# Patient Record
Sex: MALE
[De-identification: names, ages, dates, MRNs, and addresses within clinical notes are randomized per-mention and may not be internally consistent; named-entity substitution may affect disease eponyms.]

---

## 2019-06-19 ENCOUNTER — HOSPITAL ENCOUNTER (INPATIENT)
Dept: HOSPITAL 5 - ED | Age: 69
LOS: 8 days | Discharge: NURSING, REHAB OR LONG-TERM CARE | DRG: 871 | End: 2019-06-27
Attending: INTERNAL MEDICINE | Admitting: INTERNAL MEDICINE
Payer: MEDICARE

## 2019-06-19 DIAGNOSIS — E78.5: ICD-10-CM

## 2019-06-19 DIAGNOSIS — A41.9: Primary | ICD-10-CM

## 2019-06-19 DIAGNOSIS — G45.9: ICD-10-CM

## 2019-06-19 DIAGNOSIS — E87.0: ICD-10-CM

## 2019-06-19 DIAGNOSIS — J18.9: ICD-10-CM

## 2019-06-19 DIAGNOSIS — J01.90: ICD-10-CM

## 2019-06-19 DIAGNOSIS — I16.0: ICD-10-CM

## 2019-06-19 DIAGNOSIS — F03.90: ICD-10-CM

## 2019-06-19 DIAGNOSIS — G93.41: ICD-10-CM

## 2019-06-19 DIAGNOSIS — E87.6: ICD-10-CM

## 2019-06-19 LAB
ALBUMIN SERPL-MCNC: 4.4 G/DL (ref 3.9–5)
ALT SERPL-CCNC: 26 UNITS/L (ref 7–56)
APTT BLD: 29.6 SEC. (ref 24.2–36.6)
BASOPHILS # (AUTO): 0.1 K/MM3 (ref 0–0.1)
BASOPHILS NFR BLD AUTO: 0.9 % (ref 0–1.8)
BENZODIAZEPINES SCREEN,URINE: (no result)
BILIRUB UR QL STRIP: (no result)
BLOOD UR QL VISUAL: (no result)
BUN SERPL-MCNC: 19 MG/DL (ref 9–20)
BUN/CREAT SERPL: 16 %
CALCIUM SERPL-MCNC: 9.1 MG/DL (ref 8.4–10.2)
EOSINOPHIL # BLD AUTO: 0.1 K/MM3 (ref 0–0.4)
EOSINOPHIL NFR BLD AUTO: 1.2 % (ref 0–4.3)
HCT VFR BLD CALC: 38.9 % (ref 35.5–45.6)
HEMOLYSIS INDEX: 9
HGB BLD-MCNC: 13.8 GM/DL (ref 11.8–15.2)
INR PPP: 1.02 (ref 0.87–1.13)
LYMPHOCYTES # BLD AUTO: 1.9 K/MM3 (ref 1.2–5.4)
LYMPHOCYTES NFR BLD AUTO: 15.4 % (ref 13.4–35)
MCHC RBC AUTO-ENTMCNC: 36 % (ref 32–34)
MCV RBC AUTO: 90 FL (ref 84–94)
METHADONE SCREEN,URINE: (no result)
MONOCYTES # (AUTO): 0.9 K/MM3 (ref 0–0.8)
MONOCYTES % (AUTO): 7.7 % (ref 0–7.3)
OPIATE SCREEN,URINE: (no result)
PH UR STRIP: 7 [PH] (ref 5–7)
PLATELET # BLD: 195 K/MM3 (ref 140–440)
RBC # BLD AUTO: 4.33 M/MM3 (ref 3.65–5.03)
RBC #/AREA URNS HPF: 2 /HPF (ref 0–6)
UROBILINOGEN UR-MCNC: 2 MG/DL (ref ?–2)
WBC #/AREA URNS HPF: < 1 /HPF (ref 0–6)

## 2019-06-19 PROCEDURE — 81001 URINALYSIS AUTO W/SCOPE: CPT

## 2019-06-19 PROCEDURE — 71045 X-RAY EXAM CHEST 1 VIEW: CPT

## 2019-06-19 PROCEDURE — G0480 DRUG TEST DEF 1-7 CLASSES: HCPCS

## 2019-06-19 PROCEDURE — 93010 ELECTROCARDIOGRAM REPORT: CPT

## 2019-06-19 PROCEDURE — 80307 DRUG TEST PRSMV CHEM ANLYZR: CPT

## 2019-06-19 PROCEDURE — 82140 ASSAY OF AMMONIA: CPT

## 2019-06-19 PROCEDURE — 80061 LIPID PANEL: CPT

## 2019-06-19 PROCEDURE — 96361 HYDRATE IV INFUSION ADD-ON: CPT

## 2019-06-19 PROCEDURE — 93880 EXTRACRANIAL BILAT STUDY: CPT

## 2019-06-19 PROCEDURE — 96365 THER/PROPH/DIAG IV INF INIT: CPT

## 2019-06-19 PROCEDURE — 80048 BASIC METABOLIC PNL TOTAL CA: CPT

## 2019-06-19 PROCEDURE — 80053 COMPREHEN METABOLIC PANEL: CPT

## 2019-06-19 PROCEDURE — 84100 ASSAY OF PHOSPHORUS: CPT

## 2019-06-19 PROCEDURE — 99285 EMERGENCY DEPT VISIT HI MDM: CPT

## 2019-06-19 PROCEDURE — 87040 BLOOD CULTURE FOR BACTERIA: CPT

## 2019-06-19 PROCEDURE — 85025 COMPLETE CBC W/AUTO DIFF WBC: CPT

## 2019-06-19 PROCEDURE — 85610 PROTHROMBIN TIME: CPT

## 2019-06-19 PROCEDURE — 80320 DRUG SCREEN QUANTALCOHOLS: CPT

## 2019-06-19 PROCEDURE — 36415 COLL VENOUS BLD VENIPUNCTURE: CPT

## 2019-06-19 PROCEDURE — 83735 ASSAY OF MAGNESIUM: CPT

## 2019-06-19 PROCEDURE — 85730 THROMBOPLASTIN TIME PARTIAL: CPT

## 2019-06-19 PROCEDURE — 70450 CT HEAD/BRAIN W/O DYE: CPT

## 2019-06-19 PROCEDURE — 93005 ELECTROCARDIOGRAM TRACING: CPT

## 2019-06-19 RX ADMIN — POTASSIUM CHLORIDE ONE MEQ: 1500 TABLET, EXTENDED RELEASE ORAL at 22:29

## 2019-06-19 RX ADMIN — Medication SCH ML: at 22:29

## 2019-06-19 RX ADMIN — POTASSIUM CHLORIDE ONE: 1500 TABLET, EXTENDED RELEASE ORAL at 23:28

## 2019-06-19 RX ADMIN — DOCUSATE SODIUM SCH MG: 100 CAPSULE, LIQUID FILLED ORAL at 22:28

## 2019-06-19 RX ADMIN — LORAZEPAM PRN MG: 2 INJECTION INTRAMUSCULAR; INTRAVENOUS at 22:22

## 2019-06-19 NOTE — EMERGENCY DEPARTMENT REPORT
ED Altered Mental Status HPI





- General


Chief Complaint: Altered Mental Status


Stated Complaint: TATUM


Time Seen by Provider: 06/19/19 15:56


Source: patient, EMS


Mode of arrival: Stretcher


Limitations: Altered Mental Status





- History of Present Illness


Initial Comments: 





68-year-old  male found [employer], very disoriented, does not know the 

date, time, and not oriented to person.  He was then transferred to ED via EMS. 

Finger stick glucose were within normal limits, upon arrival, patient denies any

focal weakness, was oriented to time but not to person and place.  [facility] ED 

patient became more oriented, denies any focal weakness, complain of a mild 

generalized weakness.  Unable to locate patient's family although several 

attempts were made, by calling the phone numbers on his phone that had similar 

last name to his.  After a few hours in ED patient's neighbor was able to be 

located, he states that the patient's mental status is totally different from 

what he used to. 





- Related Data


                                Home Medications











 Medication  Instructions  Recorded  Confirmed  Last Taken


 


Unobtainable  06/19/19 06/19/19 Unknown











                                    Allergies











Allergy/AdvReac Type Severity Reaction Status Date / Time


 


No Known Allergies Allergy   Unverified 06/19/19 16:03














ED Review of Systems


ROS: 


Stated complaint: TATUM


Other details as noted in HPI





Comment: Unobtainable due to pts medical conditions





ED Past Medical Hx





- Social History


Smoking Status: Current Every Day Smoker


Substance Use Type: None





- Medications


Home Medications: 


                                Home Medications











 Medication  Instructions  Recorded  Confirmed  Last Taken  Type


 


Unobtainable  06/19/19 06/19/19 Unknown History














ED Physical Exam





- General


Limitations: Altered Mental Status


General appearance: alert, in no apparent distress





- Head


Head exam: Present: atraumatic, normocephalic





- Eye


Eye exam: Present: normal appearance





- ENT


ENT exam: Present: normal exam





- Neck


Neck exam: Present: normal inspection





- Respiratory


Respiratory exam: Present: other (decreased breath sound bilaterally)





- Cardiovascular


Cardiovascular Exam: Present: regular rate





- GI/Abdominal


GI/Abdominal exam: Present: soft, normal bowel sounds





- Extremities Exam


Extremities exam: Present: normal inspection





- Back Exam


Back exam: Present: normal inspection





- Neurological Exam


Neurological exam: Present: alert, oriented X3





- Assessment


Assessment Interval: Baseline





- Level of Consciousness


1a. Level of Consciousness: alert/keenly responsive





- LOC Questions


1b. LOC Questions: answers both correctly





- LOC Command


1c. LOC Commands: performs tasks correctly





- Best Gaze


2. Best Gaze: normal





- Visual


3. Visual: no visual loss





- Facial Palsy


4. Facial Palsy: normal symmetrical movement





- Motor Arm


5a. Motor Arm Left: no drift


5b. Motor Arm Right: no drift





- Motor Leg


6a. Motor Leg Left: no drift


6b. Motor Leg Right: no drift





- Limb Ataxia


7. Limb Ataxia: absent





- Sensory


8. Sensory: normal





- Best Language


9. Best Language: no aphasia





- Dysarthria


10. Dysarthria: normal





- Extinction and Inattention


11. Extinction/Inattention: no abnormality





- Scoring


Total Score: 0


Stroke Severity: No Stroke Symptoms





ED Course


                                   Vital Signs











  06/19/19 06/19/19 06/19/19





  15:53 15:59 17:22


 


Temperature 98.2 F  


 


Pulse Rate 90  70


 


Respiratory 19 19 18





Rate   


 


Blood Pressure 160/97  


 


Blood Pressure   163/89





[Left]   


 


O2 Sat by Pulse 96 96 96





Oximetry   














  06/19/19





  18:54


 


Temperature 


 


Pulse Rate 78


 


Respiratory 14





Rate 


 


Blood Pressure 


 


Blood Pressure 186/97





[Left] 


 


O2 Sat by Pulse 96





Oximetry 














- Lab Data


Result diagrams: 


                                06/19/19 Unknown





                                06/19/19 Unknown


                                   Lab Results











  06/19/19 06/19/19 06/19/19 Range/Units





  16:08 17:50 17:50 


 


WBC     (4.5-11.0)  K/mm3


 


RBC     (3.65-5.03)  M/mm3


 


Hgb     (11.8-15.2)  gm/dl


 


Hct     (35.5-45.6)  %


 


MCV     (84-94)  fl


 


MCH     (28-32)  pg


 


MCHC     (32-34)  %


 


RDW     (13.2-15.2)  %


 


Plt Count     (140-440)  K/mm3


 


Lymph % (Auto)     (13.4-35.0)  %


 


Mono % (Auto)     (0.0-7.3)  %


 


Eos % (Auto)     (0.0-4.3)  %


 


Baso % (Auto)     (0.0-1.8)  %


 


Lymph #     (1.2-5.4)  K/mm3


 


Mono #     (0.0-0.8)  K/mm3


 


Eos #     (0.0-0.4)  K/mm3


 


Baso #     (0.0-0.1)  K/mm3


 


Seg Neutrophils %     (40.0-70.0)  %


 


Seg Neutrophils #     (1.8-7.7)  K/mm3


 


PT     (12.2-14.9)  Sec.


 


INR     (0.87-1.13)  


 


APTT     (24.2-36.6)  Sec.


 


Sodium     (137-145)  mmol/L


 


Potassium     (3.6-5.0)  mmol/L


 


Chloride     ()  mmol/L


 


Carbon Dioxide     (22-30)  mmol/L


 


Anion Gap     mmol/L


 


BUN     (9-20)  mg/dL


 


Creatinine     (0.8-1.5)  mg/dL


 


Estimated GFR     ml/min


 


BUN/Creatinine Ratio     %


 


Glucose     ()  mg/dL


 


Lactic Acid  1.40    (0.7-2.0)  mmol/L


 


Calcium     (8.4-10.2)  mg/dL


 


Total Bilirubin     (0.1-1.2)  mg/dL


 


AST     (5-40)  units/L


 


ALT     (7-56)  units/L


 


Alkaline Phosphatase     ()  units/L


 


Total Protein     (6.3-8.2)  g/dL


 


Albumin     (3.9-5)  g/dL


 


Albumin/Globulin Ratio     %


 


Urine Color   Yellow   (Yellow)  


 


Urine Turbidity   Clear   (Clear)  


 


Urine pH   7.0   (5.0-7.0)  


 


Ur Specific Gravity   1.012   (1.003-1.030)  


 


Urine Protein   100 mg/dl   (Negative)  mg/dL


 


Urine Glucose (UA)   Neg   (Negative)  mg/dL


 


Urine Ketones   Tr   (Negative)  mg/dL


 


Urine Blood   Neg   (Negative)  


 


Urine Nitrite   Neg   (Negative)  


 


Urine Bilirubin   Neg   (Negative)  


 


Urine Urobilinogen   2.0   (<2.0)  mg/dL


 


Ur Leukocyte Esterase   Neg   (Negative)  


 


Urine WBC (Auto)   < 1.0   (0.0-6.0)  /HPF


 


Urine RBC (Auto)   2.0   (0.0-6.0)  /HPF


 


Urine Opiates Screen    Presumptive negative  


 


Urine Methadone Screen    Presumptive negative  


 


Ur Barbiturates Screen    Presumptive negative  


 


Ur Phencyclidine Scrn    Presumptive negative  


 


Ur Amphetamines Screen    Presumptive negative  


 


U Benzodiazepines Scrn    Presumptive negative  


 


Urine Cocaine Screen    Presumptive negative  


 


U Marijuana (THC) Screen    Presumptive negative  


 


Drugs of Abuse Note    Disclamer  


 


Plasma/Serum Alcohol     (0-0.07)  %














  06/19/19 06/19/19 06/19/19 Range/Units





  19:22 Unknown Unknown 


 


WBC   12.3 H   (4.5-11.0)  K/mm3


 


RBC   4.33   (3.65-5.03)  M/mm3


 


Hgb   13.8   (11.8-15.2)  gm/dl


 


Hct   38.9   (35.5-45.6)  %


 


MCV   90   (84-94)  fl


 


MCH   32   (28-32)  pg


 


MCHC   36 H   (32-34)  %


 


RDW   14.3   (13.2-15.2)  %


 


Plt Count   195   (140-440)  K/mm3


 


Lymph % (Auto)   15.4   (13.4-35.0)  %


 


Mono % (Auto)   7.7 H   (0.0-7.3)  %


 


Eos % (Auto)   1.2   (0.0-4.3)  %


 


Baso % (Auto)   0.9   (0.0-1.8)  %


 


Lymph #   1.9   (1.2-5.4)  K/mm3


 


Mono #   0.9 H   (0.0-0.8)  K/mm3


 


Eos #   0.1   (0.0-0.4)  K/mm3


 


Baso #   0.1   (0.0-0.1)  K/mm3


 


Seg Neutrophils %   74.8 H   (40.0-70.0)  %


 


Seg Neutrophils #   9.2 H   (1.8-7.7)  K/mm3


 


PT    13.1  (12.2-14.9)  Sec.


 


INR    1.02  (0.87-1.13)  


 


APTT    29.6  (24.2-36.6)  Sec.


 


Sodium     (137-145)  mmol/L


 


Potassium     (3.6-5.0)  mmol/L


 


Chloride     ()  mmol/L


 


Carbon Dioxide     (22-30)  mmol/L


 


Anion Gap     mmol/L


 


BUN     (9-20)  mg/dL


 


Creatinine     (0.8-1.5)  mg/dL


 


Estimated GFR     ml/min


 


BUN/Creatinine Ratio     %


 


Glucose     ()  mg/dL


 


Lactic Acid     (0.7-2.0)  mmol/L


 


Calcium     (8.4-10.2)  mg/dL


 


Total Bilirubin     (0.1-1.2)  mg/dL


 


AST     (5-40)  units/L


 


ALT     (7-56)  units/L


 


Alkaline Phosphatase     ()  units/L


 


Total Protein     (6.3-8.2)  g/dL


 


Albumin     (3.9-5)  g/dL


 


Albumin/Globulin Ratio     %


 


Urine Color     (Yellow)  


 


Urine Turbidity     (Clear)  


 


Urine pH     (5.0-7.0)  


 


Ur Specific Gravity     (1.003-1.030)  


 


Urine Protein     (Negative)  mg/dL


 


Urine Glucose (UA)     (Negative)  mg/dL


 


Urine Ketones     (Negative)  mg/dL


 


Urine Blood     (Negative)  


 


Urine Nitrite     (Negative)  


 


Urine Bilirubin     (Negative)  


 


Urine Urobilinogen     (<2.0)  mg/dL


 


Ur Leukocyte Esterase     (Negative)  


 


Urine WBC (Auto)     (0.0-6.0)  /HPF


 


Urine RBC (Auto)     (0.0-6.0)  /HPF


 


Urine Opiates Screen     


 


Urine Methadone Screen     


 


Ur Barbiturates Screen     


 


Ur Phencyclidine Scrn     


 


Ur Amphetamines Screen     


 


U Benzodiazepines Scrn     


 


Urine Cocaine Screen     


 


U Marijuana (THC) Screen     


 


Drugs of Abuse Note     


 


Plasma/Serum Alcohol  < 0.01    (0-0.07)  %














  06/19/19 Range/Units





  Unknown 


 


WBC   (4.5-11.0)  K/mm3


 


RBC   (3.65-5.03)  M/mm3


 


Hgb   (11.8-15.2)  gm/dl


 


Hct   (35.5-45.6)  %


 


MCV   (84-94)  fl


 


MCH   (28-32)  pg


 


MCHC   (32-34)  %


 


RDW   (13.2-15.2)  %


 


Plt Count   (140-440)  K/mm3


 


Lymph % (Auto)   (13.4-35.0)  %


 


Mono % (Auto)   (0.0-7.3)  %


 


Eos % (Auto)   (0.0-4.3)  %


 


Baso % (Auto)   (0.0-1.8)  %


 


Lymph #   (1.2-5.4)  K/mm3


 


Mono #   (0.0-0.8)  K/mm3


 


Eos #   (0.0-0.4)  K/mm3


 


Baso #   (0.0-0.1)  K/mm3


 


Seg Neutrophils %   (40.0-70.0)  %


 


Seg Neutrophils #   (1.8-7.7)  K/mm3


 


PT   (12.2-14.9)  Sec.


 


INR   (0.87-1.13)  


 


APTT   (24.2-36.6)  Sec.


 


Sodium  141  (137-145)  mmol/L


 


Potassium  3.5 L  (3.6-5.0)  mmol/L


 


Chloride  104.2  ()  mmol/L


 


Carbon Dioxide  21 L  (22-30)  mmol/L


 


Anion Gap  19  mmol/L


 


BUN  19  (9-20)  mg/dL


 


Creatinine  1.2  (0.8-1.5)  mg/dL


 


Estimated GFR  > 60  ml/min


 


BUN/Creatinine Ratio  16  %


 


Glucose  116 H  ()  mg/dL


 


Lactic Acid   (0.7-2.0)  mmol/L


 


Calcium  9.1  (8.4-10.2)  mg/dL


 


Total Bilirubin  0.60  (0.1-1.2)  mg/dL


 


AST  21  (5-40)  units/L


 


ALT  26  (7-56)  units/L


 


Alkaline Phosphatase  64  ()  units/L


 


Total Protein  7.0  (6.3-8.2)  g/dL


 


Albumin  4.4  (3.9-5)  g/dL


 


Albumin/Globulin Ratio  1.7  %


 


Urine Color   (Yellow)  


 


Urine Turbidity   (Clear)  


 


Urine pH   (5.0-7.0)  


 


Ur Specific Gravity   (1.003-1.030)  


 


Urine Protein   (Negative)  mg/dL


 


Urine Glucose (UA)   (Negative)  mg/dL


 


Urine Ketones   (Negative)  mg/dL


 


Urine Blood   (Negative)  


 


Urine Nitrite   (Negative)  


 


Urine Bilirubin   (Negative)  


 


Urine Urobilinogen   (<2.0)  mg/dL


 


Ur Leukocyte Esterase   (Negative)  


 


Urine WBC (Auto)   (0.0-6.0)  /HPF


 


Urine RBC (Auto)   (0.0-6.0)  /HPF


 


Urine Opiates Screen   


 


Urine Methadone Screen   


 


Ur Barbiturates Screen   


 


Ur Phencyclidine Scrn   


 


Ur Amphetamines Screen   


 


U Benzodiazepines Scrn   


 


Urine Cocaine Screen   


 


U Marijuana (THC) Screen   


 


Drugs of Abuse Note   


 


Plasma/Serum Alcohol   (0-0.07)  %














- Medical Decision Making








68-year-old  male found [employer], very disoriented, does not know the 

date, time, and not oriented to person.  He was then transferred to ED via EMS. 

 Finger stick glucose were within normal limits, upon arrival, patient denies 

any focal weakness, was oriented to time but not to person and place.  [facility] ED 

patient became more oriented, denies any focal weakness, complain of a mild 

generalized weakness.  Unable to locate patient's family although several 

attempts were made, by calling the phone numbers on his phone that had similar 

last name to his.  After a few hours in ED patient's neighbor was able to be 

located, he states that the patient's mental status is totally different from 

what he used to. 





Pulmonary infiltrate in x-ray, will cover with antibiotics.  Obtain blood 

cultures.  [facility]


Critical care attestation.: 


If time is entered above; I have spent that time in minutes in the direct care 

of this critically ill patient, excluding procedure time.








ED Disposition


Clinical Impression: 


Altered mental status


Qualifiers:


 Altered mental status type: unspecified Qualified Code(s): R41.82 - Altered 

mental status, unspecified





Pneumonia


Qualifiers:


 Pneumonia type: due to unspecified organism Laterality: bilateral Lung 

location: unspecified part of lung Qualified Code(s): J18.9 - Pneumonia, 

unspecified organism





Disposition: DC-09 OP ADMIT IP TO THIS HOSP


Is pt being admited?: Yes


Does the pt Need Aspirin: No


Condition: Stable


Instructions:  Bacterial Pneumonia (ED)


Referrals: 


[facility], [place] [Primary Care Provider] - 3-5 Days

## 2019-06-19 NOTE — HISTORY AND PHYSICAL REPORT
History of Present Illness


Date of examination: 06/19/19


Date of admission: 


06/19/19 19:36





Chief complaint: 





Altered mental status


History of present illness: 





68-year-old  male found at local [facility], where he was attempted to 

drive his car inside the building who presents to our facility via EMS.  He was 

found to be confused and disoriented.  Patient is unable to provide history due 

to his altered mental status.  Patient's neighbor Juan is at bedside but 

unable to provide detailed medical history.  However Juan verified patient's 

mentation is not baseline.  Patient has a brother by the name of Luis F was also 

able to provide any detailed information.  Information is needed or for patient 

updates please contact Juan at [phone].








Past History


Past Medical History: No medical history (unable to obtain due to patient's 

altered mental status)





Medications and Allergies


                                    Allergies











Allergy/AdvReac Type Severity Reaction Status Date / Time


 


No Known Allergies Allergy   Unverified 06/19/19 16:03











                                Home Medications











 Medication  Instructions  Recorded  Confirmed  Last Taken  Type


 


Unobtainable  06/19/19 06/19/19 Unknown History











Active Meds: 


Active Medications





Acetaminophen (Tylenol)  650 mg PO Q4H PRN


   PRN Reason: Pain MILD(1-3)/Fever >100.5/HA


Amlodipine Besylate (Norvasc)  5 mg PO ONCE ONE


   Stop: 06/19/19 20:17


Aspirin (Baby Aspirin)  81 mg PO QDAY [facility]


Atorvastatin Calcium (Lipitor)  20 mg PO QHS [facility]


Docusate Sodium (Colace)  100 mg PO BID [facility]


Enoxaparin Sodium (Lovenox)  40 mg SUB-Q QDAY [facility]


Hydralazine HCl (Apresoline)  10 mg IV Q4HR PRN


   PRN Reason: Blood Pressure


Sodium Chloride (Nacl 0.9% 1000 Ml)  1,000 mls @ 999 mls/hr IV BOLUS ONE


   Stop: 06/19/19 20:37


Levofloxacin/Dextrose (Levaquin 500mg/100ml)  500 mg in 100 mls @ 100 mls/hr IV 

Q24HR [facility]; Protocol


Ondansetron HCl (Zofran)  4 mg IV Q8H PRN


   PRN Reason: Nausea And Vomiting


Potassium Chloride (K-Dur)  40 meq PO ONCE ONE


   Stop: 06/19/19 20:18


Sodium Chloride (Sodium Chloride Flush Syringe 10 Ml)  10 ml IV BID [facility]


Sodium Chloride (Sodium Chloride Flush Syringe 10 Ml)  10 ml IV PRN PRN


   PRN Reason: LINE FLUSH











Review of Systems


ROS unobtainable: due to mental status





Exam





- Physical Exam


Narrative exam: 





Physical exam





General appearance: Present: No acute distress, confused, alert, oriented to 

self and place, older adult  male





- EENT


Eyes: Present: PERRL, EOM intact


ENT: hearing intact, poor dentition





- Neck


Neck: Present: supple, normal ROM





- Respiratory


Respiratory effort: Non-labored


Respiratory: bilateral: diminished (bases)





- Cardiovascular


Heart rate:  85 (bpm)


Rhythm: Sinus rhythm, incomplete right bundle branch block


Heart Sounds: Present: S1 & S2.  Absent: rub, click





- Extremities


Extremities: no ischemia, pulses intact, 








- Peripheral Assessment


Peripheral Pulses: within normal limits


 


- Abdominal


General gastrointestinal: soft, non-tender, normal bowel sounds





- Integumentary


Integumentary: Present: warm, dry





- Musculoskeletal


Musculoskeletal: Able to move all extremities





-Neurological


Neurological: CN II-XII intact





- Psychiatric


Psychiatric: Confused but cooperative





- Constitutional


Vitals: 


                                        











Temp Pulse Resp BP Pulse Ox


 


 98.2 F   78   17   178/92   95 


 


 06/19/19 20:13  06/19/19 20:13  06/19/19 20:13  06/19/19 20:13  06/19/19 20:13














Results





- Labs


CBC & Chem 7: 


                                06/19/19 Unknown





                                06/19/19 Unknown


Labs: 


                             Laboratory Last Values











WBC  12.3 K/mm3 (4.5-11.0)  H  06/19/19  Unknown


 


RBC  4.33 M/mm3 (3.65-5.03)   06/19/19  Unknown


 


Hgb  13.8 gm/dl (11.8-15.2)   06/19/19  Unknown


 


Hct  38.9 % (35.5-45.6)   06/19/19  Unknown


 


MCV  90 fl (84-94)   06/19/19  Unknown


 


MCH  32 pg (28-32)   06/19/19  Unknown


 


MCHC  36 % (32-34)  H  06/19/19  Unknown


 


RDW  14.3 % (13.2-15.2)   06/19/19  Unknown


 


Plt Count  195 K/mm3 (140-440)   06/19/19  Unknown


 


Lymph % (Auto)  15.4 % (13.4-35.0)   06/19/19  Unknown


 


Mono % (Auto)  7.7 % (0.0-7.3)  H  06/19/19  Unknown


 


Eos % (Auto)  1.2 % (0.0-4.3)   06/19/19  Unknown


 


Baso % (Auto)  0.9 % (0.0-1.8)   06/19/19  Unknown


 


Lymph #  1.9 K/mm3 (1.2-5.4)   06/19/19  Unknown


 


Mono #  0.9 K/mm3 (0.0-0.8)  H  06/19/19  Unknown


 


Eos #  0.1 K/mm3 (0.0-0.4)   06/19/19  Unknown


 


Baso #  0.1 K/mm3 (0.0-0.1)   06/19/19  Unknown


 


Seg Neutrophils %  74.8 % (40.0-70.0)  H  06/19/19  Unknown


 


Seg Neutrophils #  9.2 K/mm3 (1.8-7.7)  H  06/19/19  Unknown


 


PT  13.1 Sec. (12.2-14.9)   06/19/19  Unknown


 


INR  1.02  (0.87-1.13)   06/19/19  Unknown


 


APTT  29.6 Sec. (24.2-36.6)   06/19/19  Unknown


 


Sodium  141 mmol/L (137-145)   06/19/19  Unknown


 


Potassium  3.5 mmol/L (3.6-5.0)  L  06/19/19  Unknown


 


Chloride  104.2 mmol/L ()   06/19/19  Unknown


 


Carbon Dioxide  21 mmol/L (22-30)  L  06/19/19  Unknown


 


  19 mmol/L  06/19/19  Unknown


 


BUN  19 mg/dL (9-20)   06/19/19  Unknown


 


  1.2 mg/dL (0.8-1.5)   06/19/19  Unknown


 


Estimated GFR  > 60 ml/min  06/19/19  Unknown


 


  16 %  06/19/19  Unknown


 


Glucose  116 mg/dL ()  H  06/19/19  Unknown


 


Lactic Acid  1.40 mmol/L (0.7-2.0)   06/19/19  16:08    


 


Calcium  9.1 mg/dL (8.4-10.2)   06/19/19  Unknown


 


  0.60 mg/dL (0.1-1.2)   06/19/19  Unknown


 


AST  21 units/L (5-40)   06/19/19  Unknown


 


ALT  26 units/L (7-56)   06/19/19  Unknown


 


  64 units/L ()   06/19/19  Unknown


 


  7.0 g/dL (6.3-8.2)   06/19/19  Unknown


 


  4.4 g/dL (3.9-5)   06/19/19  Unknown


 


  1.7 %  06/19/19  Unknown


 


  Yellow  (Yellow)   06/19/19  17:50    


 


  Clear  (Clear)   06/19/19  17:50    


 


  7.0  (5.0-7.0)   06/19/19  17:50    


 


Ur Specific Gravity  1.012  (1.003-1.030)   06/19/19  17:50    


 


  100 mg/dl mg/dL (Negative)   06/19/19  17:50    


 


  Neg mg/dL (Negative)   06/19/19  17:50    


 


  Tr mg/dL (Negative)   06/19/19  17:50    


 


  Neg  (Negative)   06/19/19  17:50    


 


  Neg  (Negative)   06/19/19  17:50    


 


  Neg  (Negative)   06/19/19  17:50    


 


  2.0 mg/dL (<2.0)   06/19/19  17:50    


 


Ur Leukocyte Esterase  Neg  (Negative)   06/19/19  17:50    


 


  < 1.0 /HPF (0.0-6.0)   06/19/19  17:50    


 


  2.0 /HPF (0.0-6.0)   06/19/19  17:50    


 


  Presumptive negative   06/19/19  17:50    


 


  Presumptive negative   06/19/19  17:50    


 


Ur Barbiturates Screen  Presumptive negative   06/19/19  17:50    


 


Ur Phencyclidine Scrn  Presumptive negative   06/19/19  17:50    


 


Ur Amphetamines Screen  Presumptive negative   06/19/19  17:50    


 


U Benzodiazepines Scrn  Presumptive negative   06/19/19  17:50    


 


  Presumptive negative   06/19/19  17:50    


 


U Marijuana (THC) Screen  Presumptive negative   06/19/19  17:50    


 


  Disclamer   06/19/19  17:50    


 


Plasma/Serum Alcohol  < 0.01 % (0-0.07)   06/19/19  19:22    














- Imaging and Cardiology


EKG: image reviewed (sinus rhythm 85 bpm, incomplete right bundle branch block)


Chest x-ray: report reviewed (1. Patchy areas of pulmonary consolidation right 

upper lung field and both lung bases suggestive of pulmonary infiltrates. PA and

lateral views of the chest were CT chest may be helpful for further evaluation. 

 2. Enlarged cardiac silhouette.   ), image reviewed


CT Scan - head: report reviewed (IMPRESSION: 1. White matter changes that most 

likely represent chronic postischemic demyelination/small vessel disease and 

chronic lacunar infarcts in the cerebral white matter and left basal ganglia of 

indeterminate age.  If there is a clinical suspicion of acute cerebralischemia 

and if there is no clinical  contraindication, MRI brain may be helpful.    2. 

Bilateral ethmoid and right frontal sinus disease with  possible acute sinusitis

right frontal  sinus.), image reviewed





Assessment and Plan


Assessment and plan: 





68-year-old  male found at [facility], where he was attempted to 

drive his car inside the building who presents to our facility via EMS.  He was 

found to be confused and disoriented.  Upon arrival to our facility patient is 

found to be in hypertensive urgency with blood pressure 186/97.  Blood glucose 

WNL.  CXR showed  patchy areas of pulmonary consolidation right upper lung field

and both lung bases suggestive of pulmonary infiltrates.  WBC elevated at 12.3. 

UA and toxicology are negative.  CT head showed postischemic demyelination/small

vessel disease and chronic lacunar infarcts in the cerebral white matter and 

left basal ganglia showed chronic.  At the time of my examination patient 

orientation has slightly improved.  He is oriented to self and place but 

otherwise remains confused.  Will admit to Telemetry. 








Community acquired pneumonia


Acute metabolic encephalopathy-likely secondary to CPAP


R/O TIA


Hypertensive urgency


Abnormal head CT


Hypokalemia


Leukocytosis








Plan:


Continue supportive care


Continuous telemetry monitoring


Neurochecks


Monitor WBC


Start Levaquin 500 mg every 24 hours


MRI brain pending


Monitor electrolytes; replete as needed


Potassium 40 mEq by mouth 1


Monitor BP


IV hydralazine when necessary


Start Norvasc 5 mg daily


Lipid panel pending


ASA 81 mg daily, Lipitor 20 mg daily at bedtime








Advance Directives: No


VTE prophylaxis?: Chemical


Plan of care discussed with patient/family: Yes

## 2019-06-19 NOTE — XRAY REPORT
PROCEDURE: XR CHEST 1V AP 

 

TECHNIQUE:  Chest radiograph single view.  

 

HISTORY: Altered Mental Status 

 

COMPARISONS: None . 

 

FINDINGS: 

There is the appearance of patchy areas of pulmonary consolidation in the right upper lung field and 
lung bases bilaterally suggestive of pulmonary infiltrates. 

There is no evidence of pneumothorax or pleural fluid collection. 

The cardiac silhouette is enlarged. 

The thoracic aorta is mildly tortuous. 

The bony structures are notable for levocurvature of the lower thoracic spine. 

 

IMPRESSION: 

 

1. Patchy areas of pulmonary consolidation right upper lung field and both lung bases suggestive of p
ulmonary infiltrates. 

PA and lateral views of the chest were CT chest may be helpful for further evaluation. 

 

2. Enlarged cardiac silhouette. 

 

This document is electronically signed by Eliana Triplett MD., June 19 2019 06:33:02 PM ET

## 2019-06-19 NOTE — CAT SCAN REPORT
PROCEDURE:  CT HEAD/BRAIN WO CON 

 

TECHNIQUE:  Computerized tomography of the head was performed without contrast material.  

 

CT DOSE LENGTH PRODUCT:  1077.2  mGycm 

 

HISTORY: AMS 

 

COMPARISONS:  None . 

 

FINDINGS: 

Low-attenuation in the subcortical and deep white matter of the cerebral hemispheres bilaterally most
 likely represents chronic postischemic demyelination/small vessel disease. 

There appear to be lacunar infarcts in the white matter of the corona radiata, centrum semiovale erica
l and left caudate of indeterminate age. 

There is no evidence of intracranial hemorrhage or mass effect. 

Ventricular size is concordant with the degree of atrophy. 

There is atherosclerotic vascular calcification of the internal carotid arteries bilaterally and left
 vertebral artery at the skull base. 

The visualized portions of the orbits, paranasal and mastoid sinuses are notable for near complete op
acification of the right frontal sinus with an air-fluid level. There is mild to moderate bilateral e
thmoid sinus mucosal thickening. 

The bony structures are unremarkable. 

 

IMPRESSION: 

1. White matter changes that most likely represent chronic postischemic demyelination/small vessel di
sease and chronic lacunar infarcts in the cerebral white matter and left basal ganglia of indetermina
te age. 

If there is a clinical suspicion of acute cerebral ischemia and if there is no clinical contraindicat
ion, MRI brain may be helpful. 

2. Bilateral ethmoid and right frontal sinus disease with possible acute sinusitis right frontal sinu
s. 

 

This document is electronically signed by Eliana Triplett MD., June 19 2019 06:29:39 PM ET

## 2019-06-20 LAB
BASOPHILS # (AUTO): 0.1 K/MM3 (ref 0–0.1)
BASOPHILS NFR BLD AUTO: 0.6 % (ref 0–1.8)
BUN SERPL-MCNC: 13 MG/DL (ref 9–20)
BUN/CREAT SERPL: 13 %
CALCIUM SERPL-MCNC: 8.9 MG/DL (ref 8.4–10.2)
EOSINOPHIL # BLD AUTO: 0.2 K/MM3 (ref 0–0.4)
EOSINOPHIL NFR BLD AUTO: 1.3 % (ref 0–4.3)
HCT VFR BLD CALC: 42.1 % (ref 35.5–45.6)
HDLC SERPL-MCNC: 72 MG/DL (ref 40–59)
HEMOLYSIS INDEX: 6
HGB BLD-MCNC: 14.7 GM/DL (ref 11.8–15.2)
LYMPHOCYTES # BLD AUTO: 1.6 K/MM3 (ref 1.2–5.4)
LYMPHOCYTES NFR BLD AUTO: 13.8 % (ref 13.4–35)
MCHC RBC AUTO-ENTMCNC: 35 % (ref 32–34)
MCV RBC AUTO: 90 FL (ref 84–94)
MONOCYTES # (AUTO): 0.9 K/MM3 (ref 0–0.8)
MONOCYTES % (AUTO): 7.9 % (ref 0–7.3)
PLATELET # BLD: 185 K/MM3 (ref 140–440)
RBC # BLD AUTO: 4.68 M/MM3 (ref 3.65–5.03)

## 2019-06-20 RX ADMIN — POTASSIUM CHLORIDE NR MEQ: 1500 TABLET, EXTENDED RELEASE ORAL at 12:58

## 2019-06-20 RX ADMIN — LORAZEPAM PRN MG: 2 INJECTION INTRAMUSCULAR; INTRAVENOUS at 15:54

## 2019-06-20 RX ADMIN — DOCUSATE SODIUM SCH MG: 100 CAPSULE, LIQUID FILLED ORAL at 09:27

## 2019-06-20 RX ADMIN — LEVOFLOXACIN SCH MLS/HR: 500 INJECTION, SOLUTION INTRAVENOUS at 09:27

## 2019-06-20 RX ADMIN — LORAZEPAM PRN MG: 2 INJECTION INTRAMUSCULAR; INTRAVENOUS at 09:27

## 2019-06-20 RX ADMIN — POTASSIUM CHLORIDE NR MEQ: 1500 TABLET, EXTENDED RELEASE ORAL at 12:57

## 2019-06-20 RX ADMIN — DOCUSATE SODIUM SCH MG: 100 CAPSULE, LIQUID FILLED ORAL at 22:12

## 2019-06-20 RX ADMIN — HYDRALAZINE HYDROCHLORIDE PRN MG: 20 INJECTION INTRAMUSCULAR; INTRAVENOUS at 12:57

## 2019-06-20 RX ADMIN — LORAZEPAM PRN MG: 2 INJECTION INTRAMUSCULAR; INTRAVENOUS at 12:55

## 2019-06-20 RX ADMIN — ENOXAPARIN SODIUM SCH MG: 100 INJECTION SUBCUTANEOUS at 09:27

## 2019-06-20 RX ADMIN — Medication SCH ML: at 09:27

## 2019-06-20 RX ADMIN — ASPIRIN SCH MG: 81 TABLET, CHEWABLE ORAL at 09:27

## 2019-06-20 RX ADMIN — Medication SCH ML: at 22:12

## 2019-06-20 RX ADMIN — POTASSIUM & SODIUM PHOSPHATES POWDER PACK 280-160-250 MG SCH EACH: 280-160-250 PACK at 22:12

## 2019-06-20 NOTE — PROGRESS NOTE
Assessment and Plan


Assessment and plan: 


68-year-old  male found at local [facility], where he was attempted to 

drive his car inside the building who presents to our facility via EMS.  He was 

found to be confused and disoriented.  Upon arrival to our facility patient is 

found to be in hypertensive urgency with blood pressure 186/97.  Blood glucose 

WNL.  CXR showed  patchy areas of pulmonary consolidation right upper lung field

and both lung bases suggestive of pulmonary infiltrates.  WBC elevated at 12.3. 

UA and toxicology are negative.  CT head showed postischemic demyelination/small

vessel disease and chronic lacunar infarcts in the cerebral white matter and 

left basal ganglia showed chronic.  At the time of my examination patient 

orientation has slightly improved.  He is oriented to self and place but 

otherwise remains confused.  Will admit to Telemetry. 





Assessment;


Metabolic encephalopathy


Community acquired pneumonia


Acute metabolic encephalopathy-likely secondary to CPAP


R/O TIA


Hypertensive urgency


Abnormal head CT


Hypokalemia


Leukocytosis








Plan;


Continuous telemetry monitoring


Neurochecks


Monitor WBC


Continue Levaquin 500 mg every 24 hours


Follow MRI 


Monitor electrolytes; replete as needed


Potassium 40 mEq by mouth 1


Monitor BP


IV hydralazine when necessary


Start Norvasc 5 mg daily


Lipid panel 


ASA 81 mg daily, Lipitor 20 mg daily at bedtime





History


Interval history: 


Patient seen and examined medical records reviewed


Slightly better


Vital signs noted








Hospitalist Physical





- Constitutional


Vitals: 


                                        











Temp Pulse Resp BP Pulse Ox


 


 97.5 F L  82   18   176/105   91 


 


 06/20/19 07:45  06/19/19 23:39  06/20/19 07:45  06/20/19 07:45  06/19/19 23:39











General appearance: Present: no acute distress, well-nourished





- EENT


Eyes: Present: PERRL, EOM intact





- Neck


Neck: Present: supple, normal ROM





- Respiratory


Respiratory effort: normal


Respiratory: bilateral: diminished, negative: rales, rhonchi, wheezing





- Cardiovascular


Rhythm: regular


Heart Sounds: Present: S1 & S2





- Extremities


Extremities: no ischemia, No edema





- Abdominal


General gastrointestinal: soft, non-tender, non-distended, normal bowel sounds





- Integumentary


Integumentary: Present: clear, warm





- Psychiatric


Psychiatric: appropriate mood/affect, cooperative





- Neurologic


Neurologic: moves all extremities





Results





- Labs


CBC & Chem 7: 


                                 06/20/19 05:32





                                 06/20/19 05:32


Labs: 


                             Laboratory Last Values











WBC  11.6 K/mm3 (4.5-11.0)  H  06/20/19  05:32    


 


RBC  4.68 M/mm3 (3.65-5.03)   06/20/19  05:32    


 


Hgb  14.7 gm/dl (11.8-15.2)   06/20/19  05:32    


 


Hct  42.1 % (35.5-45.6)   06/20/19  05:32    


 


MCV  90 fl (84-94)   06/20/19  05:32    


 


MCH  32 pg (28-32)   06/20/19  05:32    


 


MCHC  35 % (32-34)  H  06/20/19  05:32    


 


RDW  14.3 % (13.2-15.2)   06/20/19  05:32    


 


Plt Count  185 K/mm3 (140-440)   06/20/19  05:32    


 


Lymph % (Auto)  13.8 % (13.4-35.0)   06/20/19  05:32    


 


Mono % (Auto)  7.9 % (0.0-7.3)  H  06/20/19  05:32    


 


Eos % (Auto)  1.3 % (0.0-4.3)   06/20/19  05:32    


 


Baso % (Auto)  0.6 % (0.0-1.8)   06/20/19  05:32    


 


Lymph #  1.6 K/mm3 (1.2-5.4)   06/20/19  05:32    


 


Mono #  0.9 K/mm3 (0.0-0.8)  H  06/20/19  05:32    


 


Eos #  0.2 K/mm3 (0.0-0.4)   06/20/19  05:32    


 


Baso #  0.1 K/mm3 (0.0-0.1)   06/20/19  05:32    


 


Seg Neutrophils %  76.4 % (40.0-70.0)  H  06/20/19  05:32    


 


Seg Neutrophils #  8.9 K/mm3 (1.8-7.7)  H  06/20/19  05:32    


 


PT  13.1 Sec. (12.2-14.9)   06/19/19  Unknown


 


INR  1.02  (0.87-1.13)   06/19/19  Unknown


 


APTT  29.6 Sec. (24.2-36.6)   06/19/19  Unknown


 


Sodium  139 mmol/L (137-145)   06/20/19  05:32    


 


Potassium  3.5 mmol/L (3.6-5.0)  L  06/20/19  05:32    


 


Chloride  103.9 mmol/L ()   06/20/19  05:32    


 


Carbon Dioxide  20 mmol/L (22-30)  L  06/20/19  05:32    


 


  19 mmol/L  06/20/19  05:32    


 


BUN  13 mg/dL (9-20)   06/20/19  05:32    


 


  1.0 mg/dL (0.8-1.5)   06/20/19  05:32    


 


Estimated GFR  > 60 ml/min  06/20/19  05:32    


 


  13 %  06/20/19  05:32    


 


Glucose  102 mg/dL ()  H  06/20/19  05:32    


 


Lactic Acid  1.40 mmol/L (0.7-2.0)   06/19/19  16:08    


 


Calcium  8.9 mg/dL (8.4-10.2)   06/20/19  05:32    


 


Phosphorus  1.60 mg/dL (2.5-4.5)  L  06/20/19  05:32    


 


Magnesium  2.30 mg/dL (1.7-2.3)   06/20/19  05:32    


 


  0.60 mg/dL (0.1-1.2)   06/19/19  Unknown


 


AST  21 units/L (5-40)   06/19/19  Unknown


 


ALT  26 units/L (7-56)   06/19/19  Unknown


 


  64 units/L ()   06/19/19  Unknown


 


  7.0 g/dL (6.3-8.2)   06/19/19  Unknown


 


  4.4 g/dL (3.9-5)   06/19/19  Unknown


 


  1.7 %  06/19/19  Unknown


 


Triglycerides  87 mg/dL (2-149)   06/20/19  05:32    


 


Cholesterol  195 mg/dL ()   06/20/19  05:32    


 


  115 mg/dL ()   06/20/19  05:32    


 


  72 mg/dL (40-59)  H  06/20/19  05:32    


 


  2.70 %  06/20/19  05:32    


 


  Yellow  (Yellow)   06/19/19  17:50    


 


  Clear  (Clear)   06/19/19  17:50    


 


  7.0  (5.0-7.0)   06/19/19  17:50    


 


Ur Specific Gravity  1.012  (1.003-1.030)   06/19/19  17:50    


 


  100 mg/dl mg/dL (Negative)   06/19/19  17:50    


 


  Neg mg/dL (Negative)   06/19/19  17:50    


 


  Tr mg/dL (Negative)   06/19/19  17:50    


 


  Neg  (Negative)   06/19/19  17:50    


 


  Neg  (Negative)   06/19/19  17:50    


 


  Neg  (Negative)   06/19/19  17:50    


 


  2.0 mg/dL (<2.0)   06/19/19  17:50    


 


Ur Leukocyte Esterase  Neg  (Negative)   06/19/19  17:50    


 


  < 1.0 /HPF (0.0-6.0)   06/19/19  17:50    


 


  2.0 /HPF (0.0-6.0)   06/19/19  17:50    


 


  Presumptive negative   06/19/19  17:50    


 


  Presumptive negative   06/19/19  17:50    


 


Ur Barbiturates Screen  Presumptive negative   06/19/19  17:50    


 


Ur Phencyclidine Scrn  Presumptive negative   06/19/19  17:50    


 


Ur Amphetamines Screen  Presumptive negative   06/19/19  17:50    


 


U Benzodiazepines Scrn  Presumptive negative   06/19/19  17:50    


 


  Presumptive negative   06/19/19  17:50    


 


U Marijuana (THC) Screen  Presumptive negative   06/19/19  17:50    


 


  Disclamer   06/19/19  17:50    


 


Plasma/Serum Alcohol  < 0.01 % (0-0.07)   06/19/19  19:22    














Active Medications





- Current Medications


Current Medications: 














Generic Name Dose Route Start Last Admin





  Trade Name Freq  PRN Reason Stop Dose Admin


 


Acetaminophen  650 mg  06/19/19 20:08 





  Tylenol  PO  





  Q4H PRN  





  Pain MILD(1-3)/Fever >100.5/HA  


 


Aspirin  81 mg  06/20/19 10:00  06/20/19 09:27





  Baby Aspirin  PO   81 mg





  QDAY SARA   Administration


 


Atorvastatin Calcium  20 mg  06/19/19 22:00  06/19/19 22:28





  Lipitor  PO   20 mg





  QHS SARA   Administration


 


Docusate Sodium  100 mg  06/19/19 22:00  06/20/19 09:27





  Colace  PO   100 mg





  BID SARA   Administration


 


Enoxaparin Sodium  40 mg  06/20/19 10:00  06/20/19 09:27





  Lovenox  SUB-Q   40 mg





  QDAY SARA   Administration


 


Hydralazine HCl  10 mg  06/19/19 20:14 





  Apresoline  IV  





  Q4H PRN  





  Blood Pressure  


 


Levofloxacin/Dextrose  500 mg in 100 mls @ 100 mls/hr  06/20/19 10:00  06/20/19 

09:27





  Levaquin 500mg/100ml  IV   100 mls/hr





  Q24HR SARA   Administration





  Protocol  


 


Lorazepam  2 mg  06/19/19 21:56  06/20/19 09:27





  Ativan  IV   2 mg





  Q1H PRN   Administration





  CIWA-Ar 8-15  


 


Lorazepam  4 mg  06/19/19 21:56 





  Ativan  IV  





  Q1H PRN  





  CIWA-Ar 16-25  


 


Ondansetron HCl  4 mg  06/19/19 20:08 





  Zofran  IV  





  Q8H PRN  





  Nausea And Vomiting  


 


Sodium Chloride  10 ml  06/19/19 22:00  06/20/19 09:27





  Sodium Chloride Flush Syringe 10 Ml  IV   10 ml





  BID SARA   Administration


 


Sodium Chloride  10 ml  06/19/19 20:08 





  Sodium Chloride Flush Syringe 10 Ml  IV  





  PRN PRN  





  LINE FLUSH

## 2019-06-21 RX ADMIN — LORAZEPAM PRN MG: 2 INJECTION INTRAMUSCULAR; INTRAVENOUS at 03:40

## 2019-06-21 RX ADMIN — LEVOFLOXACIN SCH MLS/HR: 500 INJECTION, SOLUTION INTRAVENOUS at 12:49

## 2019-06-21 RX ADMIN — LORAZEPAM PRN MG: 2 INJECTION INTRAMUSCULAR; INTRAVENOUS at 14:01

## 2019-06-21 RX ADMIN — ASPIRIN SCH MG: 81 TABLET, CHEWABLE ORAL at 12:48

## 2019-06-21 RX ADMIN — POTASSIUM & SODIUM PHOSPHATES POWDER PACK 280-160-250 MG SCH EACH: 280-160-250 PACK at 22:08

## 2019-06-21 RX ADMIN — HYDRALAZINE HYDROCHLORIDE PRN MG: 20 INJECTION INTRAMUSCULAR; INTRAVENOUS at 07:30

## 2019-06-21 RX ADMIN — Medication SCH ML: at 22:08

## 2019-06-21 RX ADMIN — POTASSIUM & SODIUM PHOSPHATES POWDER PACK 280-160-250 MG SCH EACH: 280-160-250 PACK at 13:19

## 2019-06-21 RX ADMIN — DOCUSATE SODIUM SCH MG: 100 CAPSULE, LIQUID FILLED ORAL at 22:08

## 2019-06-21 RX ADMIN — POTASSIUM & SODIUM PHOSPHATES POWDER PACK 280-160-250 MG SCH EACH: 280-160-250 PACK at 06:43

## 2019-06-21 RX ADMIN — Medication SCH ML: at 10:49

## 2019-06-21 RX ADMIN — HYDRALAZINE HYDROCHLORIDE PRN MG: 20 INJECTION INTRAMUSCULAR; INTRAVENOUS at 00:54

## 2019-06-21 RX ADMIN — DOCUSATE SODIUM SCH MG: 100 CAPSULE, LIQUID FILLED ORAL at 12:50

## 2019-06-21 RX ADMIN — ENOXAPARIN SODIUM SCH MG: 100 INJECTION SUBCUTANEOUS at 10:48

## 2019-06-21 RX ADMIN — LORAZEPAM PRN MG: 2 INJECTION INTRAMUSCULAR; INTRAVENOUS at 08:10

## 2019-06-21 NOTE — PROGRESS NOTE
Assessment and Plan


Assessment and plan: 


68-year-old  male found at local [facility], where he was attempted to 

drive his car inside the building who presents to our facility via EMS.  He was 

found to be confused and disoriented.  Upon arrival to our facility patient is 

found to be in hypertensive urgency with blood pressure 186/97.  Blood glucose 

WNL.  CXR showed  patchy areas of pulmonary consolidation right upper lung field

and both lung bases suggestive of pulmonary infiltrates.  





Assessment;


Metabolic encephalopathy


Community acquired pneumonia


Acute metabolic encephalopathy-likely secondary to CPAP


R/O TIA


Hypertensive urgency


Abnormal head CT


Hypokalemia


Leukocytosis








Plan;


Continuous telemetry monitoring


Neurochecks


Monitor WBC


Continue Levaquin 500 mg every 24 hours


Follow MRI 


Monitor electrolytes; replete as needed


Potassium 40 mEq by mouth 1


Monitor BP


IV hydralazine when necessary


Start Norvasc 5 mg daily


Lipid panel 


ASA 81 mg daily, Lipitor 20 mg daily at bedtime








History


Interval history: 


Patient seen and examined medical records reviewed


Patient is more alert and awake, confused at times


Awaiting MRI, No family available for pre MRI checklist


Vital signs noted








Hospitalist Physical





- Constitutional


Vitals: 


                                        











Temp Pulse Resp BP Pulse Ox


 


 98.6 F   107 H  18   160/99   95 


 


 06/21/19 16:16  06/21/19 16:16  06/21/19 16:16  06/21/19 16:16  06/21/19 16:16











General appearance: Present: no acute distress, well-nourished, other (confused 

at times)





- EENT


Eyes: Present: PERRL, EOM intact





- Neck


Neck: Present: supple, normal ROM





- Respiratory


Respiratory effort: normal


Respiratory: bilateral: diminished, negative: rales, rhonchi, wheezing





- Cardiovascular


Rhythm: regular


Heart Sounds: Present: S1 & S2





- Extremities


Extremities: no ischemia, No edema





- Abdominal


General gastrointestinal: soft, non-tender, non-distended, normal bowel sounds





- Integumentary


Integumentary: Present: clear, warm





- Psychiatric


Psychiatric: appropriate mood/affect, other (confused at times)





- Neurologic


Neurologic: CNII-XII intact, moves all extremities





Results





- Labs


CBC & Chem 7: 


                                 06/24/19 05:25





                                 06/24/19 05:25


Labs: 


                             Laboratory Last Values











WBC  11.6 K/mm3 (4.5-11.0)  H  06/20/19  05:32    


 


RBC  4.68 M/mm3 (3.65-5.03)   06/20/19  05:32    


 


Hgb  14.7 gm/dl (11.8-15.2)   06/20/19  05:32    


 


Hct  42.1 % (35.5-45.6)   06/20/19  05:32    


 


MCV  90 fl (84-94)   06/20/19  05:32    


 


MCH  32 pg (28-32)   06/20/19  05:32    


 


MCHC  35 % (32-34)  H  06/20/19  05:32    


 


RDW  14.3 % (13.2-15.2)   06/20/19  05:32    


 


Plt Count  185 K/mm3 (140-440)   06/20/19  05:32    


 


Lymph % (Auto)  13.8 % (13.4-35.0)   06/20/19  05:32    


 


Mono % (Auto)  7.9 % (0.0-7.3)  H  06/20/19  05:32    


 


Eos % (Auto)  1.3 % (0.0-4.3)   06/20/19  05:32    


 


Baso % (Auto)  0.6 % (0.0-1.8)   06/20/19  05:32    


 


Lymph #  1.6 K/mm3 (1.2-5.4)   06/20/19  05:32    


 


Mono #  0.9 K/mm3 (0.0-0.8)  H  06/20/19  05:32    


 


Eos #  0.2 K/mm3 (0.0-0.4)   06/20/19  05:32    


 


Baso #  0.1 K/mm3 (0.0-0.1)   06/20/19  05:32    


 


Seg Neutrophils %  76.4 % (40.0-70.0)  H  06/20/19  05:32    


 


Seg Neutrophils #  8.9 K/mm3 (1.8-7.7)  H  06/20/19  05:32    


 


PT  13.1 Sec. (12.2-14.9)   06/19/19  Unknown


 


INR  1.02  (0.87-1.13)   06/19/19  Unknown


 


APTT  29.6 Sec. (24.2-36.6)   06/19/19  Unknown


 


Sodium  139 mmol/L (137-145)   06/20/19  05:32    


 


Potassium  3.5 mmol/L (3.6-5.0)  L  06/20/19  05:32    


 


Chloride  103.9 mmol/L ()   06/20/19  05:32    


 


Carbon Dioxide  20 mmol/L (22-30)  L  06/20/19  05:32    


 


  19 mmol/L  06/20/19  05:32    


 


BUN  13 mg/dL (9-20)   06/20/19  05:32    


 


  1.0 mg/dL (0.8-1.5)   06/20/19  05:32    


 


Estimated GFR  > 60 ml/min  06/20/19  05:32    


 


  13 %  06/20/19  05:32    


 


Glucose  102 mg/dL ()  H  06/20/19  05:32    


 


Lactic Acid  1.40 mmol/L (0.7-2.0)   06/19/19  16:08    


 


Calcium  8.9 mg/dL (8.4-10.2)   06/20/19  05:32    


 


Phosphorus  1.60 mg/dL (2.5-4.5)  L  06/20/19  05:32    


 


Magnesium  2.30 mg/dL (1.7-2.3)   06/20/19  05:32    


 


  0.60 mg/dL (0.1-1.2)   06/19/19  Unknown


 


AST  21 units/L (5-40)   06/19/19  Unknown


 


ALT  26 units/L (7-56)   06/19/19  Unknown


 


  64 units/L ()   06/19/19  Unknown


 


  7.0 g/dL (6.3-8.2)   06/19/19  Unknown


 


  4.4 g/dL (3.9-5)   06/19/19  Unknown


 


  1.7 %  06/19/19  Unknown


 


Triglycerides  87 mg/dL (2-149)   06/20/19  05:32    


 


Cholesterol  195 mg/dL ()   06/20/19  05:32    


 


  115 mg/dL ()   06/20/19  05:32    


 


  72 mg/dL (40-59)  H  06/20/19  05:32    


 


  2.70 %  06/20/19  05:32    


 


  Yellow  (Yellow)   06/19/19  17:50    


 


  Clear  (Clear)   06/19/19  17:50    


 


  7.0  (5.0-7.0)   06/19/19  17:50    


 


Ur Specific Gravity  1.012  (1.003-1.030)   06/19/19  17:50    


 


  100 mg/dl mg/dL (Negative)   06/19/19  17:50    


 


  Neg mg/dL (Negative)   06/19/19  17:50    


 


  Tr mg/dL (Negative)   06/19/19  17:50    


 


  Neg  (Negative)   06/19/19  17:50    


 


  Neg  (Negative)   06/19/19  17:50    


 


  Neg  (Negative)   06/19/19  17:50    


 


  2.0 mg/dL (<2.0)   06/19/19  17:50    


 


Ur Leukocyte Esterase  Neg  (Negative)   06/19/19  17:50    


 


  < 1.0 /HPF (0.0-6.0)   06/19/19  17:50    


 


  2.0 /HPF (0.0-6.0)   06/19/19  17:50    


 


  Presumptive negative   06/19/19  17:50    


 


  Presumptive negative   06/19/19  17:50    


 


Ur Barbiturates Screen  Presumptive negative   06/19/19  17:50    


 


Ur Phencyclidine Scrn  Presumptive negative   06/19/19  17:50    


 


Ur Amphetamines Screen  Presumptive negative   06/19/19  17:50    


 


U Benzodiazepines Scrn  Presumptive negative   06/19/19  17:50    


 


  Presumptive negative   06/19/19  17:50    


 


U Marijuana (THC) Screen  Presumptive negative   06/19/19  17:50    


 


  Disclamer   06/19/19  17:50    


 


Plasma/Serum Alcohol  < 0.01 % (0-0.07)   06/19/19  19:22    














Active Medications





- Current Medications


Current Medications: 














Generic Name Dose Route Start Last Admin





  Trade Name Freq  PRN Reason Stop Dose Admin


 


Acetaminophen  650 mg  06/19/19 20:08 





  Tylenol  PO  





  Q4H PRN  





  Pain MILD(1-3)/Fever >100.5/HA  


 


Aspirin  81 mg  06/20/19 10:00  06/21/19 12:48





  Baby Aspirin  PO   81 mg





  QDAY SARA   Administration


 


Atorvastatin Calcium  20 mg  06/19/19 22:00  06/20/19 22:12





  Lipitor  PO   20 mg





  QHS SARA   Administration


 


Docusate Sodium  100 mg  06/19/19 22:00  06/21/19 12:50





  Colace  PO   100 mg





  BID SARA   Administration


 


Enoxaparin Sodium  40 mg  06/20/19 10:00  06/21/19 10:48





  Lovenox  SUB-Q   40 mg





  QDAY SARA   Administration


 


Hydralazine HCl  10 mg  06/19/19 20:14  06/21/19 07:30





  Apresoline  IV   10 mg





  Q4H PRN   Administration





  Blood Pressure  


 


Levofloxacin/Dextrose  500 mg in 100 mls @ 100 mls/hr  06/20/19 10:00  06/21/19 

12:49





  Levaquin 500mg/100ml  IV   100 mls/hr





  Q24HR SARA   Administration





  Protocol  


 


Lorazepam  2 mg  06/19/19 21:56  06/21/19 14:01





  Ativan  IV   2 mg





  Q1H PRN   Administration





  CIWA-Ar 8-15  


 


Lorazepam  4 mg  06/19/19 21:56 





  Ativan  IV  





  Q1H PRN  





  CIWA-Ar 16-25  


 


Ondansetron HCl  4 mg  06/19/19 20:08 





  Zofran  IV  





  Q8H PRN  





  Nausea And Vomiting  


 


Potassium Phos/Sodium Phos  1 each  06/20/19 22:00  06/21/19 13:19





  Phos-Nak  PO   1 each





  Q8HR SARA   Administration


 


Sodium Chloride  10 ml  06/19/19 22:00  06/21/19 10:49





  Sodium Chloride Flush Syringe 10 Ml  IV   10 ml





  BID SARA   Administration


 


Sodium Chloride  10 ml  06/19/19 20:08 





  Sodium Chloride Flush Syringe 10 Ml  IV  





  PRN PRN  





  LINE FLUSH

## 2019-06-22 LAB
BASOPHILS # (AUTO): 0.1 K/MM3 (ref 0–0.1)
BASOPHILS NFR BLD AUTO: 0.9 % (ref 0–1.8)
BUN SERPL-MCNC: 24 MG/DL (ref 9–20)
BUN/CREAT SERPL: 18 %
CALCIUM SERPL-MCNC: 10.3 MG/DL (ref 8.4–10.2)
EOSINOPHIL # BLD AUTO: 0.1 K/MM3 (ref 0–0.4)
EOSINOPHIL NFR BLD AUTO: 0.7 % (ref 0–4.3)
HCT VFR BLD CALC: 45.6 % (ref 35.5–45.6)
HEMOLYSIS INDEX: 9
HGB BLD-MCNC: 15.9 GM/DL (ref 11.8–15.2)
LYMPHOCYTES # BLD AUTO: 2 K/MM3 (ref 1.2–5.4)
LYMPHOCYTES NFR BLD AUTO: 14.1 % (ref 13.4–35)
MCHC RBC AUTO-ENTMCNC: 35 % (ref 32–34)
MCV RBC AUTO: 90 FL (ref 84–94)
MONOCYTES # (AUTO): 1.2 K/MM3 (ref 0–0.8)
MONOCYTES % (AUTO): 8.3 % (ref 0–7.3)
PLATELET # BLD: 241 K/MM3 (ref 140–440)
RBC # BLD AUTO: 5.05 M/MM3 (ref 3.65–5.03)

## 2019-06-22 RX ADMIN — Medication SCH ML: at 21:59

## 2019-06-22 RX ADMIN — HYDRALAZINE HYDROCHLORIDE PRN MG: 20 INJECTION INTRAMUSCULAR; INTRAVENOUS at 16:16

## 2019-06-22 RX ADMIN — LEVOFLOXACIN SCH MLS/HR: 500 INJECTION, SOLUTION INTRAVENOUS at 10:28

## 2019-06-22 RX ADMIN — LORAZEPAM PRN MG: 2 INJECTION INTRAMUSCULAR; INTRAVENOUS at 01:10

## 2019-06-22 RX ADMIN — ASPIRIN SCH MG: 81 TABLET, CHEWABLE ORAL at 10:28

## 2019-06-22 RX ADMIN — LORAZEPAM PRN MG: 2 INJECTION INTRAMUSCULAR; INTRAVENOUS at 16:06

## 2019-06-22 RX ADMIN — LORAZEPAM PRN MG: 2 INJECTION INTRAMUSCULAR; INTRAVENOUS at 06:36

## 2019-06-22 RX ADMIN — DOCUSATE SODIUM SCH MG: 100 CAPSULE, LIQUID FILLED ORAL at 10:28

## 2019-06-22 RX ADMIN — DOCUSATE SODIUM SCH MG: 100 CAPSULE, LIQUID FILLED ORAL at 21:58

## 2019-06-22 RX ADMIN — HYDRALAZINE HYDROCHLORIDE PRN MG: 20 INJECTION INTRAMUSCULAR; INTRAVENOUS at 00:29

## 2019-06-22 RX ADMIN — HYDRALAZINE HYDROCHLORIDE PRN MG: 20 INJECTION INTRAMUSCULAR; INTRAVENOUS at 06:35

## 2019-06-22 RX ADMIN — POTASSIUM & SODIUM PHOSPHATES POWDER PACK 280-160-250 MG SCH: 280-160-250 PACK at 05:28

## 2019-06-22 RX ADMIN — ENOXAPARIN SODIUM SCH MG: 100 INJECTION SUBCUTANEOUS at 10:28

## 2019-06-22 RX ADMIN — POTASSIUM & SODIUM PHOSPHATES POWDER PACK 280-160-250 MG SCH EACH: 280-160-250 PACK at 21:58

## 2019-06-22 RX ADMIN — Medication SCH ML: at 10:30

## 2019-06-22 RX ADMIN — POTASSIUM & SODIUM PHOSPHATES POWDER PACK 280-160-250 MG SCH EACH: 280-160-250 PACK at 15:53

## 2019-06-22 RX ADMIN — HYDRALAZINE HYDROCHLORIDE SCH MG: 25 TABLET, FILM COATED ORAL at 21:58

## 2019-06-22 RX ADMIN — LORAZEPAM PRN MG: 2 INJECTION INTRAMUSCULAR; INTRAVENOUS at 22:17

## 2019-06-22 RX ADMIN — LORAZEPAM PRN MG: 2 INJECTION INTRAMUSCULAR; INTRAVENOUS at 05:30

## 2019-06-22 RX ADMIN — LORAZEPAM PRN MG: 2 INJECTION INTRAMUSCULAR; INTRAVENOUS at 00:19

## 2019-06-22 NOTE — PROGRESS NOTE
Assessment and Plan


Assessment and plan: 


Assessment;


--Metabolic encephalopathy; multifactorial, possible sepsis, CVA TIA


Supportive care, CT negative for acute abnormality


Check MRI of the brain[unable to obtain as no family available]





--Possible TIA: Supportive care as, further neuro workup  if needed





--Community acquired pneumonia; empiric antibiotics


Follow cultures and supportive care





--Hypertensive urgency; present on admission


Moderate control, add oral hydralazine, when necessary IV hydralazine


Closely monitor





--Acute sinusitis; on CT, empiric antibiotics


Closely monitor





--Hypokalemia; replace per protocol monitor levels





--Hypernatremia;D5W, monitor electrolytes





--Leukocytosis; secondary to possible pneumonia, acute sinusitis


Trending down, continue antibiotics, follow cultures





--Dyslipidemia; continue statin





--DVT to prophylaxis; Lovenox





--Full code





Monitor closely and adjust management as needed








History


Interval history: 


Patient seen and examined medical records reviewed


No new events reported


Patient is confused and agitated


No family available


Pending MRI 


Signs reviewed








Hospitalist Physical





- Constitutional


Vitals: 


                                        











Temp Pulse Resp BP Pulse Ox


 


 98.1 F   97 H  20   162/97   97 


 


 06/22/19 08:36  06/22/19 04:00  06/22/19 08:36  06/22/19 08:36  06/22/19 04:00











General appearance: Present: no acute distress, well-nourished, other (confused 

at times)





- EENT


Eyes: Present: PERRL, EOM intact





- Neck


Neck: Present: supple, normal ROM





- Respiratory


Respiratory effort: normal


Respiratory: bilateral: diminished, negative: rales, rhonchi, wheezing





- Cardiovascular


Rhythm: regular


Heart Sounds: Present: S1 & S2





- Extremities


Extremities: no ischemia, No edema





- Abdominal


General gastrointestinal: soft, non-tender, non-distended, normal bowel sounds





- Integumentary


Integumentary: Present: clear, warm





- Psychiatric


Psychiatric: agitated, other (confused)





- Neurologic


Neurologic: moves all extremities





Results





- Labs


CBC & Chem 7: 


                                 06/24/19 05:25





                                 06/24/19 05:25


Labs: 


                             Laboratory Last Values











WBC  14.3 K/mm3 (4.5-11.0)  H  06/22/19  05:35    


 


RBC  5.05 M/mm3 (3.65-5.03)  H  06/22/19  05:35    


 


Hgb  15.9 gm/dl (11.8-15.2)  H  06/22/19  05:35    


 


Hct  45.6 % (35.5-45.6)   06/22/19  05:35    


 


MCV  90 fl (84-94)   06/22/19  05:35    


 


MCH  31 pg (28-32)   06/22/19  05:35    


 


MCHC  35 % (32-34)  H  06/22/19  05:35    


 


RDW  14.5 % (13.2-15.2)   06/22/19  05:35    


 


Plt Count  241 K/mm3 (140-440)   06/22/19  05:35    


 


Lymph % (Auto)  14.1 % (13.4-35.0)   06/22/19  05:35    


 


Mono % (Auto)  8.3 % (0.0-7.3)  H  06/22/19  05:35    


 


Eos % (Auto)  0.7 % (0.0-4.3)   06/22/19  05:35    


 


Baso % (Auto)  0.9 % (0.0-1.8)   06/22/19  05:35    


 


Lymph #  2.0 K/mm3 (1.2-5.4)   06/22/19  05:35    


 


Mono #  1.2 K/mm3 (0.0-0.8)  H  06/22/19  05:35    


 


Eos #  0.1 K/mm3 (0.0-0.4)   06/22/19  05:35    


 


Baso #  0.1 K/mm3 (0.0-0.1)   06/22/19  05:35    


 


Seg Neutrophils %  76.0 % (40.0-70.0)  H  06/22/19  05:35    


 


Seg Neutrophils #  10.9 K/mm3 (1.8-7.7)  H  06/22/19  05:35    


 


PT  13.1 Sec. (12.2-14.9)   06/19/19  Unknown


 


INR  1.02  (0.87-1.13)   06/19/19  Unknown


 


APTT  29.6 Sec. (24.2-36.6)   06/19/19  Unknown


 


Sodium  147 mmol/L (137-145)  H D 06/22/19  05:35    


 


Potassium  3.6 mmol/L (3.6-5.0)   06/22/19  05:35    


 


Chloride  108.6 mmol/L ()  H  06/22/19  05:35    


 


Carbon Dioxide  18 mmol/L (22-30)  L  06/22/19  05:35    


 


  24 mmol/L  06/22/19  05:35    


 


BUN  24 mg/dL (9-20)  H  06/22/19  05:35    


 


  1.3 mg/dL (0.8-1.5)   06/22/19  05:35    


 


Estimated GFR  55 ml/min  06/22/19  05:35    


 


  18 %  06/22/19  05:35    


 


Glucose  120 mg/dL ()  H  06/22/19  05:35    


 


Lactic Acid  1.40 mmol/L (0.7-2.0)   06/19/19  16:08    


 


Calcium  10.3 mg/dL (8.4-10.2)  H D 06/22/19  05:35    


 


Phosphorus  3.30 mg/dL (2.5-4.5)   06/22/19  05:35    


 


Magnesium  2.60 mg/dL (1.7-2.3)  H  06/22/19  05:35    


 


  0.60 mg/dL (0.1-1.2)   06/19/19  Unknown


 


AST  21 units/L (5-40)   06/19/19  Unknown


 


ALT  26 units/L (7-56)   06/19/19  Unknown


 


  64 units/L ()   06/19/19  Unknown


 


  7.0 g/dL (6.3-8.2)   06/19/19  Unknown


 


  4.4 g/dL (3.9-5)   06/19/19  Unknown


 


  1.7 %  06/19/19  Unknown


 


Triglycerides  87 mg/dL (2-149)   06/20/19  05:32    


 


Cholesterol  195 mg/dL ()   06/20/19  05:32    


 


  115 mg/dL ()   06/20/19  05:32    


 


  72 mg/dL (40-59)  H  06/20/19  05:32    


 


  2.70 %  06/20/19  05:32    


 


  Yellow  (Yellow)   06/19/19  17:50    


 


  Clear  (Clear)   06/19/19  17:50    


 


  7.0  (5.0-7.0)   06/19/19  17:50    


 


Ur Specific Gravity  1.012  (1.003-1.030)   06/19/19  17:50    


 


  100 mg/dl mg/dL (Negative)   06/19/19  17:50    


 


  Neg mg/dL (Negative)   06/19/19  17:50    


 


  Tr mg/dL (Negative)   06/19/19  17:50    


 


  Neg  (Negative)   06/19/19  17:50    


 


  Neg  (Negative)   06/19/19  17:50    


 


  Neg  (Negative)   06/19/19  17:50    


 


  2.0 mg/dL (<2.0)   06/19/19  17:50    


 


Ur Leukocyte Esterase  Neg  (Negative)   06/19/19  17:50    


 


  < 1.0 /HPF (0.0-6.0)   06/19/19  17:50    


 


  2.0 /HPF (0.0-6.0)   06/19/19  17:50    


 


  Presumptive negative   06/19/19  17:50    


 


  Presumptive negative   06/19/19  17:50    


 


Ur Barbiturates Screen  Presumptive negative   06/19/19  17:50    


 


Ur Phencyclidine Scrn  Presumptive negative   06/19/19  17:50    


 


Ur Amphetamines Screen  Presumptive negative   06/19/19  17:50    


 


U Benzodiazepines Scrn  Presumptive negative   06/19/19  17:50    


 


  Presumptive negative   06/19/19  17:50    


 


U Marijuana (THC) Screen  Presumptive negative   06/19/19  17:50    


 


  Disclamer   06/19/19  17:50    


 


Plasma/Serum Alcohol  < 0.01 % (0-0.07)   06/19/19  19:22    














Active Medications





- Current Medications


Current Medications: 














Generic Name Dose Route Start Last Admin





  Trade Name Freq  PRN Reason Stop Dose Admin


 


Acetaminophen  650 mg  06/19/19 20:08 





  Tylenol  PO  





  Q4H PRN  





  Pain MILD(1-3)/Fever >100.5/HA  


 


Aspirin  81 mg  06/20/19 10:00  06/22/19 10:28





  Baby Aspirin  PO   81 mg





  QDAY SARA   Administration


 


Atorvastatin Calcium  20 mg  06/19/19 22:00  06/21/19 22:07





  Lipitor  PO   20 mg





  QHS SARA   Administration


 


Docusate Sodium  100 mg  06/19/19 22:00  06/22/19 10:28





  Colace  PO   100 mg





  BID SARA   Administration


 


Enoxaparin Sodium  40 mg  06/20/19 10:00  06/22/19 10:28





  Lovenox  SUB-Q   40 mg





  QDAY SARA   Administration


 


Hydralazine HCl  10 mg  06/19/19 20:14  06/22/19 06:35





  Apresoline  IV   10 mg





  Q4H PRN   Administration





  Blood Pressure  


 


Levofloxacin/Dextrose  500 mg in 100 mls @ 100 mls/hr  06/20/19 10:00  06/22/19 

10:28





  Levaquin 500mg/100ml  IV   100 mls/hr





  Q24HR SARA   Administration





  Protocol  


 


Lorazepam  2 mg  06/19/19 21:56  06/22/19 06:36





  Ativan  IV   2 mg





  Q1H PRN   Administration





  CIWA-Ar 8-15  


 


Lorazepam  4 mg  06/19/19 21:56 





  Ativan  IV  





  Q1H PRN  





  CIWA-Ar 16-25  


 


Ondansetron HCl  4 mg  06/19/19 20:08 





  Zofran  IV  





  Q8H PRN  





  Nausea And Vomiting  


 


Potassium Phos/Sodium Phos  1 each  06/20/19 22:00  06/22/19 05:28





  Phos-Nak  PO   Not Given





  Q8HR SARA  


 


Sodium Chloride  10 ml  06/19/19 22:00  06/22/19 10:30





  Sodium Chloride Flush Syringe 10 Ml  IV   10 ml





  BID SARA   Administration


 


Sodium Chloride  10 ml  06/19/19 20:08 





  Sodium Chloride Flush Syringe 10 Ml  IV  





  PRN PRN  





  LINE FLUSH

## 2019-06-23 LAB
BASOPHILS # (AUTO): 0.1 K/MM3 (ref 0–0.1)
BASOPHILS NFR BLD AUTO: 0.4 % (ref 0–1.8)
BUN SERPL-MCNC: 31 MG/DL (ref 9–20)
BUN/CREAT SERPL: 24 %
CALCIUM SERPL-MCNC: 10.1 MG/DL (ref 8.4–10.2)
EOSINOPHIL # BLD AUTO: 0.3 K/MM3 (ref 0–0.4)
EOSINOPHIL NFR BLD AUTO: 2.7 % (ref 0–4.3)
HCT VFR BLD CALC: 45.2 % (ref 35.5–45.6)
HEMOLYSIS INDEX: 20
HGB BLD-MCNC: 15.4 GM/DL (ref 11.8–15.2)
LYMPHOCYTES # BLD AUTO: 1.8 K/MM3 (ref 1.2–5.4)
LYMPHOCYTES NFR BLD AUTO: 15.7 % (ref 13.4–35)
MCHC RBC AUTO-ENTMCNC: 34 % (ref 32–34)
MCV RBC AUTO: 92 FL (ref 84–94)
MONOCYTES # (AUTO): 1.1 K/MM3 (ref 0–0.8)
MONOCYTES % (AUTO): 9.3 % (ref 0–7.3)
PLATELET # BLD: 225 K/MM3 (ref 140–440)
RBC # BLD AUTO: 4.94 M/MM3 (ref 3.65–5.03)

## 2019-06-23 RX ADMIN — ENOXAPARIN SODIUM SCH MG: 100 INJECTION SUBCUTANEOUS at 09:46

## 2019-06-23 RX ADMIN — LEVOFLOXACIN SCH MLS/HR: 500 INJECTION, SOLUTION INTRAVENOUS at 09:46

## 2019-06-23 RX ADMIN — HYDRALAZINE HYDROCHLORIDE SCH MG: 25 TABLET, FILM COATED ORAL at 15:12

## 2019-06-23 RX ADMIN — Medication SCH ML: at 09:46

## 2019-06-23 RX ADMIN — HYDRALAZINE HYDROCHLORIDE SCH MG: 25 TABLET, FILM COATED ORAL at 06:14

## 2019-06-23 RX ADMIN — LORAZEPAM PRN MG: 2 INJECTION INTRAMUSCULAR; INTRAVENOUS at 17:11

## 2019-06-23 RX ADMIN — LORAZEPAM PRN MG: 2 INJECTION INTRAMUSCULAR; INTRAVENOUS at 11:53

## 2019-06-23 RX ADMIN — LORAZEPAM PRN MG: 2 INJECTION INTRAMUSCULAR; INTRAVENOUS at 14:47

## 2019-06-23 RX ADMIN — POTASSIUM & SODIUM PHOSPHATES POWDER PACK 280-160-250 MG SCH EACH: 280-160-250 PACK at 06:14

## 2019-06-23 RX ADMIN — DEXTROSE SCH MLS/HR: 5 SOLUTION INTRAVENOUS at 03:38

## 2019-06-23 RX ADMIN — DOCUSATE SODIUM SCH MG: 100 CAPSULE, LIQUID FILLED ORAL at 22:57

## 2019-06-23 RX ADMIN — DEXTROSE SCH MLS/HR: 5 SOLUTION INTRAVENOUS at 22:57

## 2019-06-23 RX ADMIN — DOCUSATE SODIUM SCH MG: 100 CAPSULE, LIQUID FILLED ORAL at 09:46

## 2019-06-23 RX ADMIN — ASPIRIN SCH MG: 81 TABLET, CHEWABLE ORAL at 09:46

## 2019-06-23 RX ADMIN — HYDRALAZINE HYDROCHLORIDE SCH MG: 25 TABLET, FILM COATED ORAL at 22:57

## 2019-06-23 RX ADMIN — POTASSIUM & SODIUM PHOSPHATES POWDER PACK 280-160-250 MG SCH EACH: 280-160-250 PACK at 14:47

## 2019-06-23 RX ADMIN — POTASSIUM & SODIUM PHOSPHATES POWDER PACK 280-160-250 MG SCH EACH: 280-160-250 PACK at 22:56

## 2019-06-23 NOTE — VASCULAR LAB REPORT
PROCEDURE: VL CAROTID DUPLEX BILAT 

HISTORY: possible TIA/AMS 

 

FINDINGS: Real-time ultrasound of the cervical arterial vasculature was performed using grayscale and
 color Doppler images. 

 

On the right, peak systolic velocity in the common carotid artery was 93 cm/s. In the internal caroti
d it was 63 cm/s and in the external carotid 77 cm/s. Flow in the vertebral artery was antegrade at 5
9 cm/s. The ratio of flow of the internal carotid to the common carotid was 0.68 which is within norm
al limits. There is plaque in the proximal internal carotid artery resulting in a short segment estim
ated 20% stenosis. 

 

On the left, peak systolic velocity in the common carotid artery was 99 cm/s. In the internal carotid
 it was also 99 cm/s and in the external carotid 41 cm/s. Flow in the vertebral artery was antegrade 
at 27 cm/s. The ratio of flow of the internal carotid to the common carotid was 1.0 which is within n
ormal limits. 

 

IMPRESSION: No stenosis of greater than 50% is seen in the cervical arterial vasculature 

 

This document is electronically signed by Vadim Bowles MD., June 23 2019 01:02:07 PM ET

## 2019-06-23 NOTE — PROGRESS NOTE
Assessment and Plan


Assessment and plan: 


--Metabolic encephalopathy; multifactorial, possible sepsis, CVA TIA


Supportive care, 


CT negative for acute abnormality


Carotid Doppler; less than 50% stenosis


Check MRI of the brain[unable to obtain as no family available]





--Possible TIA: Supportive care as, further neuro workup  if needed





--Community acquired pneumonia; empiric antibiotics


Follow cultures and supportive care





--Hypertensive urgency; present on admission


Moderate control, add oral hydralazine, when necessary IV hydralazine


Closely monitor





--Acute sinusitis; on CT, empiric antibiotics


Closely monitor





--Hypokalemia; replace per protocol monitor levels





--Hypernatremia;D5W, monitor electrolytes





--Leukocytosis; secondary to possible pneumonia, acute sinusitis


Trending down, continue antibiotics, follow cultures





--Dyslipidemia; continue statin





--DVT to prophylaxis; Lovenox





--Full code





Patient is encephalopathic, confused, pulling


Restraints for safety





--Discharge planning; Per case management.


Social issues





Monitor closely and adjust management as needed


Stable to be transferred out of telemetry to [facility]/surg














History


Interval history: 


Patient seen and examined


Remains confused, unable to communicate intelligently


Restraints for safety, not in acute distress


Vitals reviewed








Hospitalist Physical





- Constitutional


Vitals: 


                                        











Temp Pulse Resp BP Pulse Ox


 


 97.6 F   111 H  16   130/91   92 


 


 06/23/19 17:50  06/23/19 17:50  06/23/19 17:50  06/23/19 17:50  06/23/19 17:50











General appearance: Present: no acute distress, well-nourished, other (agitated 

,confused , restrained for safety)





- EENT


Eyes: Present: PERRL, EOM intact





- Neck


Neck: Present: supple, normal ROM





- Respiratory


Respiratory effort: normal


Respiratory: negative: rales, rhonchi, wheezing





- Cardiovascular


Rhythm: regular


Heart Sounds: Present: S1 & S2





- Extremities


Extremities: no ischemia, No edema





- Abdominal


General gastrointestinal: soft, non-tender, non-distended, normal bowel sounds





- Integumentary


Integumentary: Present: clear, warm





- Psychiatric


Psychiatric: agitated, other (confused)





- Neurologic


Neurologic: moves all extremities





Results





- Labs


CBC & Chem 7: 


                                 06/23/19 05:25





                                 06/23/19 05:25


Labs: 


                             Laboratory Last Values











WBC  11.5 K/mm3 (4.5-11.0)  H  06/23/19  05:25    


 


RBC  4.94 M/mm3 (3.65-5.03)   06/23/19  05:25    


 


Hgb  15.4 gm/dl (11.8-15.2)  H  06/23/19  05:25    


 


Hct  45.2 % (35.5-45.6)   06/23/19  05:25    


 


MCV  92 fl (84-94)   06/23/19  05:25    


 


MCH  31 pg (28-32)   06/23/19  05:25    


 


MCHC  34 % (32-34)   06/23/19  05:25    


 


RDW  14.7 % (13.2-15.2)   06/23/19  05:25    


 


Plt Count  225 K/mm3 (140-440)   06/23/19  05:25    


 


Lymph % (Auto)  15.7 % (13.4-35.0)   06/23/19  05:25    


 


Mono % (Auto)  9.3 % (0.0-7.3)  H  06/23/19  05:25    


 


Eos % (Auto)  2.7 % (0.0-4.3)   06/23/19  05:25    


 


Baso % (Auto)  0.4 % (0.0-1.8)   06/23/19  05:25    


 


Lymph #  1.8 K/mm3 (1.2-5.4)   06/23/19  05:25    


 


Mono #  1.1 K/mm3 (0.0-0.8)  H  06/23/19  05:25    


 


Eos #  0.3 K/mm3 (0.0-0.4)   06/23/19  05:25    


 


Baso #  0.1 K/mm3 (0.0-0.1)   06/23/19  05:25    


 


Seg Neutrophils %  71.9 % (40.0-70.0)  H  06/23/19  05:25    


 


Seg Neutrophils #  8.3 K/mm3 (1.8-7.7)  H  06/23/19  05:25    


 


PT  13.1 Sec. (12.2-14.9)   06/19/19  Unknown


 


INR  1.02  (0.87-1.13)   06/19/19  Unknown


 


APTT  29.6 Sec. (24.2-36.6)   06/19/19  Unknown


 


Sodium  147 mmol/L (137-145)  H  06/23/19  05:25    


 


Potassium  3.5 mmol/L (3.6-5.0)  L  06/23/19  05:25    


 


Chloride  110.4 mmol/L ()  H  06/23/19  05:25    


 


Carbon Dioxide  21 mmol/L (22-30)  L  06/23/19  05:25    


 


  19 mmol/L  06/23/19  05:25    


 


BUN  31 mg/dL (9-20)  H  06/23/19  05:25    


 


  1.3 mg/dL (0.8-1.5)   06/23/19  05:25    


 


Estimated GFR  55 ml/min  06/23/19  05:25    


 


  24 %  06/23/19  05:25    


 


Glucose  135 mg/dL ()  H  06/23/19  05:25    


 


Lactic Acid  1.40 mmol/L (0.7-2.0)   06/19/19  16:08    


 


Calcium  10.1 mg/dL (8.4-10.2)   06/23/19  05:25    


 


Phosphorus  3.30 mg/dL (2.5-4.5)   06/22/19  05:35    


 


Magnesium  2.70 mg/dL (1.7-2.3)  H  06/23/19  05:25    


 


  0.60 mg/dL (0.1-1.2)   06/19/19  Unknown


 


AST  21 units/L (5-40)   06/19/19  Unknown


 


ALT  26 units/L (7-56)   06/19/19  Unknown


 


  64 units/L ()   06/19/19  Unknown


 


  7.0 g/dL (6.3-8.2)   06/19/19  Unknown


 


  4.4 g/dL (3.9-5)   06/19/19  Unknown


 


  1.7 %  06/19/19  Unknown


 


Triglycerides  87 mg/dL (2-149)   06/20/19  05:32    


 


Cholesterol  195 mg/dL ()   06/20/19  05:32    


 


  115 mg/dL ()   06/20/19  05:32    


 


  72 mg/dL (40-59)  H  06/20/19  05:32    


 


  2.70 %  06/20/19  05:32    


 


  Yellow  (Yellow)   06/19/19  17:50    


 


  Clear  (Clear)   06/19/19  17:50    


 


  7.0  (5.0-7.0)   06/19/19  17:50    


 


Ur Specific Gravity  1.012  (1.003-1.030)   06/19/19  17:50    


 


  100 mg/dl mg/dL (Negative)   06/19/19  17:50    


 


  Neg mg/dL (Negative)   06/19/19  17:50    


 


  Tr mg/dL (Negative)   06/19/19  17:50    


 


  Neg  (Negative)   06/19/19  17:50    


 


  Neg  (Negative)   06/19/19  17:50    


 


  Neg  (Negative)   06/19/19  17:50    


 


  2.0 mg/dL (<2.0)   06/19/19  17:50    


 


Ur Leukocyte Esterase  Neg  (Negative)   06/19/19  17:50    


 


  < 1.0 /HPF (0.0-6.0)   06/19/19  17:50    


 


  2.0 /HPF (0.0-6.0)   06/19/19  17:50    


 


  Presumptive negative   06/19/19  17:50    


 


  Presumptive negative   06/19/19  17:50    


 


Ur Barbiturates Screen  Presumptive negative   06/19/19  17:50    


 


Ur Phencyclidine Scrn  Presumptive negative   06/19/19  17:50    


 


Ur Amphetamines Screen  Presumptive negative   06/19/19  17:50    


 


U Benzodiazepines Scrn  Presumptive negative   06/19/19  17:50    


 


  Presumptive negative   06/19/19  17:50    


 


U Marijuana (THC) Screen  Presumptive negative   06/19/19  17:50    


 


  Disclamer   06/19/19  17:50    


 


Plasma/Serum Alcohol  < 0.01 % (0-0.07)   06/19/19  19:22    














Active Medications





- Current Medications


Current Medications: 














Generic Name Dose Route Start Last Admin





  Trade Name Freq  PRN Reason Stop Dose Admin


 


Acetaminophen  650 mg  06/19/19 20:08 





  Tylenol  PO  





  Q4H PRN  





  Pain MILD(1-3)/Fever >100.5/HA  


 


Aspirin  81 mg  06/20/19 10:00  06/23/19 09:46





  Baby Aspirin  PO   81 mg





  QDAY SARA   Administration


 


Atorvastatin Calcium  20 mg  06/19/19 22:00  06/22/19 21:58





  Lipitor  PO   20 mg





  QHS SARA   Administration


 


Docusate Sodium  100 mg  06/19/19 22:00  06/23/19 09:46





  Colace  PO   100 mg





  BID SARA   Administration


 


Enoxaparin Sodium  40 mg  06/20/19 10:00  06/23/19 09:46





  Lovenox  SUB-Q   40 mg





  QDAY SARA   Administration


 


Hydralazine HCl  10 mg  06/19/19 20:14  06/22/19 16:16





  Apresoline  IV   10 mg





  Q4H PRN   Administration





  Blood Pressure  


 


Hydralazine HCl  25 mg  06/22/19 22:00  06/23/19 15:12





  Apresoline  PO   25 mg





  Q8HR SARA   Administration


 


Levofloxacin/Dextrose  500 mg in 100 mls @ 100 mls/hr  06/20/19 10:00  06/23/19 

09:46





  Levaquin 500mg/100ml  IV   100 mls/hr





  Q24HR SARA   Administration





  Protocol  


 


Dextrose  1,000 mls @ 50 mls/hr  06/22/19 20:00  06/23/19 03:38





  D5w  IV   50 mls/hr





  AS DIRECT SARA   Administration


 


Lorazepam  2 mg  06/19/19 21:56  06/23/19 14:47





  Ativan  IV   2 mg





  Q1H PRN   Administration





  CIWA-Ar 8-15  


 


Lorazepam  4 mg  06/19/19 21:56  06/23/19 17:11





  Ativan  IV   4 mg





  Q1H PRN   Administration





  CIWA-Ar 16-25  


 


Ondansetron HCl  4 mg  06/19/19 20:08 





  Zofran  IV  





  Q8H PRN  





  Nausea And Vomiting  


 


Potassium Phos/Sodium Phos  1 each  06/20/19 22:00  06/23/19 14:47





  Phos-Nak  PO   1 each





  Q8HR SARA   Administration


 


Sodium Chloride  10 ml  06/19/19 22:00  06/23/19 09:46





  Sodium Chloride Flush Syringe 10 Ml  IV   10 ml





  BID SARA   Administration


 


Sodium Chloride  10 ml  06/19/19 20:08 





  Sodium Chloride Flush Syringe 10 Ml  IV  





  PRN PRN  





  LINE FLUSH

## 2019-06-24 LAB
BASOPHILS # (AUTO): 0.1 K/MM3 (ref 0–0.1)
BASOPHILS NFR BLD AUTO: 0.5 % (ref 0–1.8)
BUN SERPL-MCNC: 31 MG/DL (ref 9–20)
BUN/CREAT SERPL: 24 %
CALCIUM SERPL-MCNC: 10.3 MG/DL (ref 8.4–10.2)
EOSINOPHIL # BLD AUTO: 0.4 K/MM3 (ref 0–0.4)
EOSINOPHIL NFR BLD AUTO: 3.1 % (ref 0–4.3)
HCT VFR BLD CALC: 44.1 % (ref 35.5–45.6)
HEMOLYSIS INDEX: 12
HGB BLD-MCNC: 15.4 GM/DL (ref 11.8–15.2)
LYMPHOCYTES # BLD AUTO: 2.2 K/MM3 (ref 1.2–5.4)
LYMPHOCYTES NFR BLD AUTO: 16.6 % (ref 13.4–35)
MCHC RBC AUTO-ENTMCNC: 35 % (ref 32–34)
MCV RBC AUTO: 90 FL (ref 84–94)
MONOCYTES # (AUTO): 1.2 K/MM3 (ref 0–0.8)
MONOCYTES % (AUTO): 8.8 % (ref 0–7.3)
PLATELET # BLD: 217 K/MM3 (ref 140–440)
RBC # BLD AUTO: 4.89 M/MM3 (ref 3.65–5.03)

## 2019-06-24 RX ADMIN — Medication SCH: at 00:13

## 2019-06-24 RX ADMIN — DOCUSATE SODIUM SCH MG: 100 CAPSULE, LIQUID FILLED ORAL at 09:23

## 2019-06-24 RX ADMIN — HYDRALAZINE HYDROCHLORIDE SCH MG: 25 TABLET, FILM COATED ORAL at 14:10

## 2019-06-24 RX ADMIN — POTASSIUM & SODIUM PHOSPHATES POWDER PACK 280-160-250 MG SCH EACH: 280-160-250 PACK at 05:41

## 2019-06-24 RX ADMIN — DEXTROSE SCH MLS/HR: 5 SOLUTION INTRAVENOUS at 14:35

## 2019-06-24 RX ADMIN — HYDRALAZINE HYDROCHLORIDE SCH MG: 25 TABLET, FILM COATED ORAL at 05:42

## 2019-06-24 RX ADMIN — POTASSIUM & SODIUM PHOSPHATES POWDER PACK 280-160-250 MG SCH EACH: 280-160-250 PACK at 13:52

## 2019-06-24 RX ADMIN — ASPIRIN SCH MG: 81 TABLET, CHEWABLE ORAL at 09:23

## 2019-06-24 RX ADMIN — LEVOFLOXACIN SCH MG: 500 TABLET, FILM COATED ORAL at 09:23

## 2019-06-24 RX ADMIN — DOCUSATE SODIUM SCH MG: 100 CAPSULE, LIQUID FILLED ORAL at 23:26

## 2019-06-24 RX ADMIN — ENOXAPARIN SODIUM SCH MG: 100 INJECTION SUBCUTANEOUS at 09:21

## 2019-06-24 RX ADMIN — POTASSIUM & SODIUM PHOSPHATES POWDER PACK 280-160-250 MG SCH EACH: 280-160-250 PACK at 23:26

## 2019-06-24 RX ADMIN — HYDRALAZINE HYDROCHLORIDE SCH MG: 25 TABLET, FILM COATED ORAL at 23:26

## 2019-06-24 RX ADMIN — Medication SCH ML: at 09:24

## 2019-06-24 RX ADMIN — Medication SCH ML: at 23:27

## 2019-06-24 NOTE — PROGRESS NOTE
Assessment and Plan


Assessment and plan: 


--Possible TIA: Supportive care as, further neuro workup  if needed





--Metabolic encephalopathy; multifactorial, possible sepsis, CVA TIA


Supportive care, 


CT negative for acute abnormality


Carotid Doppler; less than 50% stenosis


Check MRI of the brain[unable to obtain as no family available]





--Community acquired pneumonia; empiric antibiotics


Follow cultures and supportive care





--Hypertensive urgency; present on admission


Moderate control, add oral hydralazine, when necessary IV hydralazine


Closely monitor





--Acute sinusitis; on CT, empiric antibiotics


Closely monitor





--Hypokalemia; replace per protocol monitor levels





--Hypernatremia;D5W, monitor electrolytes





--Leukocytosis; secondary to possible pneumonia, acute sinusitis


Trending down, continue antibiotics, follow cultures





--Dyslipidemia; continue statin





--DVT to prophylaxis; Lovenox





--Full code





--Discharge planning; Per case management.


Social issues














History


Interval history: 


Patient seen and examined medical records reviewed


Patient is pleasantly confused


Restraint for safety


No family available


Vital signs noted








Hospitalist Physical





- Constitutional


Vitals: 


                                        











Temp Pulse Resp BP Pulse Ox


 


 98.1 F   104 H  18   142/93   95 


 


 06/24/19 07:42  06/24/19 07:42  06/24/19 09:03  06/24/19 07:42  06/24/19 09:03











General appearance: Present: no acute distress, well-nourished, other (agitated 

,confused , restrained for safety)





- EENT


Eyes: Present: PERRL, EOM intact





- Neck


Neck: Present: supple, normal ROM





- Respiratory


Respiratory effort: normal


Respiratory: bilateral: diminished, negative: rales, rhonchi, wheezing





- Cardiovascular


Rhythm: regular


Heart Sounds: Present: S1 & S2





- Extremities


Extremities: no ischemia, No edema





- Abdominal


General gastrointestinal: soft, non-tender, non-distended, normal bowel sounds





- Integumentary


Integumentary: Present: clear, warm





- Psychiatric


Psychiatric: agitated, other (confused)





- Neurologic


Neurologic: moves all extremities





Results





- Labs


CBC & Chem 7: 


                                 06/24/19 05:25





                                 06/24/19 05:25


Labs: 


                             Laboratory Last Values











WBC  13.3 K/mm3 (4.5-11.0)  H  06/24/19  05:25    


 


RBC  4.89 M/mm3 (3.65-5.03)   06/24/19  05:25    


 


Hgb  15.4 gm/dl (11.8-15.2)  H  06/24/19  05:25    


 


Hct  44.1 % (35.5-45.6)   06/24/19  05:25    


 


MCV  90 fl (84-94)   06/24/19  05:25    


 


MCH  31 pg (28-32)   06/24/19  05:25    


 


MCHC  35 % (32-34)  H  06/24/19  05:25    


 


RDW  14.2 % (13.2-15.2)   06/24/19  05:25    


 


Plt Count  217 K/mm3 (140-440)   06/24/19  05:25    


 


Lymph % (Auto)  16.6 % (13.4-35.0)   06/24/19  05:25    


 


Mono % (Auto)  8.8 % (0.0-7.3)  H  06/24/19  05:25    


 


Eos % (Auto)  3.1 % (0.0-4.3)   06/24/19  05:25    


 


Baso % (Auto)  0.5 % (0.0-1.8)   06/24/19  05:25    


 


Lymph #  2.2 K/mm3 (1.2-5.4)   06/24/19  05:25    


 


Mono #  1.2 K/mm3 (0.0-0.8)  H  06/24/19  05:25    


 


Eos #  0.4 K/mm3 (0.0-0.4)   06/24/19  05:25    


 


Baso #  0.1 K/mm3 (0.0-0.1)   06/24/19  05:25    


 


Seg Neutrophils %  71.0 % (40.0-70.0)  H  06/24/19  05:25    


 


Seg Neutrophils #  9.4 K/mm3 (1.8-7.7)  H  06/24/19  05:25    


 


PT  13.1 Sec. (12.2-14.9)   06/19/19  Unknown


 


INR  1.02  (0.87-1.13)   06/19/19  Unknown


 


APTT  29.6 Sec. (24.2-36.6)   06/19/19  Unknown


 


Sodium  146 mmol/L (137-145)  H  06/24/19  05:25    


 


Potassium  3.9 mmol/L (3.6-5.0)   06/24/19  05:25    


 


Chloride  110.7 mmol/L ()  H  06/24/19  05:25    


 


Carbon Dioxide  20 mmol/L (22-30)  L  06/24/19  05:25    


 


  19 mmol/L  06/24/19  05:25    


 


BUN  31 mg/dL (9-20)  H  06/24/19  05:25    


 


  1.3 mg/dL (0.8-1.5)   06/24/19  05:25    


 


Estimated GFR  55 ml/min  06/24/19  05:25    


 


  24 %  06/24/19  05:25    


 


Glucose  142 mg/dL ()  H  06/24/19  05:25    


 


Lactic Acid  1.40 mmol/L (0.7-2.0)   06/19/19  16:08    


 


Calcium  10.3 mg/dL (8.4-10.2)  H  06/24/19  05:25    


 


Phosphorus  3.30 mg/dL (2.5-4.5)   06/22/19  05:35    


 


Magnesium  2.70 mg/dL (1.7-2.3)  H  06/23/19  05:25    


 


  0.60 mg/dL (0.1-1.2)   06/19/19  Unknown


 


AST  21 units/L (5-40)   06/19/19  Unknown


 


ALT  26 units/L (7-56)   06/19/19  Unknown


 


  64 units/L ()   06/19/19  Unknown


 


  7.0 g/dL (6.3-8.2)   06/19/19  Unknown


 


  4.4 g/dL (3.9-5)   06/19/19  Unknown


 


  1.7 %  06/19/19  Unknown


 


Triglycerides  87 mg/dL (2-149)   06/20/19  05:32    


 


Cholesterol  195 mg/dL ()   06/20/19  05:32    


 


  115 mg/dL ()   06/20/19  05:32    


 


  72 mg/dL (40-59)  H  06/20/19  05:32    


 


  2.70 %  06/20/19  05:32    


 


  Yellow  (Yellow)   06/19/19  17:50    


 


  Clear  (Clear)   06/19/19  17:50    


 


  7.0  (5.0-7.0)   06/19/19  17:50    


 


Ur Specific Gravity  1.012  (1.003-1.030)   06/19/19  17:50    


 


  100 mg/dl mg/dL (Negative)   06/19/19  17:50    


 


  Neg mg/dL (Negative)   06/19/19  17:50    


 


  Tr mg/dL (Negative)   06/19/19  17:50    


 


  Neg  (Negative)   06/19/19  17:50    


 


  Neg  (Negative)   06/19/19  17:50    


 


  Neg  (Negative)   06/19/19  17:50    


 


  2.0 mg/dL (<2.0)   06/19/19  17:50    


 


Ur Leukocyte Esterase  Neg  (Negative)   06/19/19  17:50    


 


  < 1.0 /HPF (0.0-6.0)   06/19/19  17:50    


 


  2.0 /HPF (0.0-6.0)   06/19/19  17:50    


 


  Presumptive negative   06/19/19  17:50    


 


  Presumptive negative   06/19/19  17:50    


 


Ur Barbiturates Screen  Presumptive negative   06/19/19  17:50    


 


Ur Phencyclidine Scrn  Presumptive negative   06/19/19  17:50    


 


Ur Amphetamines Screen  Presumptive negative   06/19/19  17:50    


 


U Benzodiazepines Scrn  Presumptive negative   06/19/19  17:50    


 


  Presumptive negative   06/19/19  17:50    


 


U Marijuana (THC) Screen  Presumptive negative   06/19/19  17:50    


 


  Disclamer   06/19/19  17:50    


 


Plasma/Serum Alcohol  < 0.01 % (0-0.07)   06/19/19  19:22    














Active Medications





- Current Medications


Current Medications: 














Generic Name Dose Route Start Last Admin





  Trade Name Freq  PRN Reason Stop Dose Admin


 


Acetaminophen  650 mg  06/19/19 20:08 





  Tylenol  PO  





  Q4H PRN  





  Pain MILD(1-3)/Fever >100.5/HA  


 


Aspirin  81 mg  06/20/19 10:00  06/24/19 09:23





  Baby Aspirin  PO   81 mg





  QDAY SARA   Administration


 


Atorvastatin Calcium  20 mg  06/19/19 22:00  06/23/19 22:57





  Lipitor  PO   20 mg





  QHS SARA   Administration


 


Docusate Sodium  100 mg  06/19/19 22:00  06/24/19 09:23





  Colace  PO   100 mg





  BID SARA   Administration


 


Enoxaparin Sodium  40 mg  06/20/19 10:00  06/24/19 09:21





  Lovenox  SUB-Q   40 mg





  QDAY SARA   Administration


 


Hydralazine HCl  10 mg  06/19/19 20:14  06/22/19 16:16





  Apresoline  IV   10 mg





  Q4H PRN   Administration





  Blood Pressure  


 


Hydralazine HCl  25 mg  06/22/19 22:00  06/24/19 05:42





  Apresoline  PO   25 mg





  Q8HR SARA   Administration


 


Dextrose  1,000 mls @ 75 mls/hr  06/22/19 20:00  06/23/19 22:57





  D5w  IV   50 mls/hr





  AS DIRECT SARA   Administration


 


Levofloxacin  500 mg  06/24/19 10:00  06/24/19 09:23





  Levaquin  PO   500 mg





  Q24HR SARA   Administration


 


Lorazepam  2 mg  06/19/19 21:56  06/23/19 14:47





  Ativan  IV   2 mg





  Q1H PRN   Administration





  CIWA-Ar 8-15  


 


Lorazepam  4 mg  06/19/19 21:56  06/23/19 17:11





  Ativan  IV   4 mg





  Q1H PRN   Administration





  CIWA-Ar 16-25  


 


Ondansetron HCl  4 mg  06/19/19 20:08 





  Zofran  IV  





  Q8H PRN  





  Nausea And Vomiting  


 


Potassium Phos/Sodium Phos  1 each  06/20/19 22:00  06/24/19 05:41





  Phos-Nak  PO   1 each





  Q8HR SARA   Administration


 


Sodium Chloride  10 ml  06/19/19 22:00  06/24/19 09:24





  Sodium Chloride Flush Syringe 10 Ml  IV   10 ml





  BID SARA   Administration


 


Sodium Chloride  10 ml  06/19/19 20:08 





  Sodium Chloride Flush Syringe 10 Ml  IV  





  PRN PRN  





  LINE FLUSH

## 2019-06-25 LAB
BASOPHILS # (AUTO): 0.1 K/MM3 (ref 0–0.1)
BASOPHILS NFR BLD AUTO: 1 % (ref 0–1.8)
BUN SERPL-MCNC: 27 MG/DL (ref 9–20)
BUN/CREAT SERPL: 21 %
CALCIUM SERPL-MCNC: 9.9 MG/DL (ref 8.4–10.2)
EOSINOPHIL # BLD AUTO: 0.2 K/MM3 (ref 0–0.4)
EOSINOPHIL NFR BLD AUTO: 1.6 % (ref 0–4.3)
HCT VFR BLD CALC: 43.9 % (ref 35.5–45.6)
HEMOLYSIS INDEX: 11
HGB BLD-MCNC: 15.3 GM/DL (ref 11.8–15.2)
LYMPHOCYTES # BLD AUTO: 1.4 K/MM3 (ref 1.2–5.4)
LYMPHOCYTES NFR BLD AUTO: 10.2 % (ref 13.4–35)
MCHC RBC AUTO-ENTMCNC: 35 % (ref 32–34)
MCV RBC AUTO: 90 FL (ref 84–94)
MONOCYTES # (AUTO): 1.1 K/MM3 (ref 0–0.8)
MONOCYTES % (AUTO): 8.2 % (ref 0–7.3)
PLATELET # BLD: 220 K/MM3 (ref 140–440)
RBC # BLD AUTO: 4.88 M/MM3 (ref 3.65–5.03)

## 2019-06-25 RX ADMIN — Medication SCH ML: at 09:30

## 2019-06-25 RX ADMIN — ENOXAPARIN SODIUM SCH MG: 100 INJECTION SUBCUTANEOUS at 09:30

## 2019-06-25 RX ADMIN — DOCUSATE SODIUM SCH MG: 100 CAPSULE, LIQUID FILLED ORAL at 09:30

## 2019-06-25 RX ADMIN — HYDRALAZINE HYDROCHLORIDE SCH MG: 25 TABLET, FILM COATED ORAL at 23:53

## 2019-06-25 RX ADMIN — HYDRALAZINE HYDROCHLORIDE SCH MG: 25 TABLET, FILM COATED ORAL at 05:52

## 2019-06-25 RX ADMIN — DOCUSATE SODIUM SCH MG: 100 CAPSULE, LIQUID FILLED ORAL at 23:53

## 2019-06-25 RX ADMIN — LEVOFLOXACIN SCH MG: 500 TABLET, FILM COATED ORAL at 09:30

## 2019-06-25 RX ADMIN — POTASSIUM & SODIUM PHOSPHATES POWDER PACK 280-160-250 MG SCH EACH: 280-160-250 PACK at 23:54

## 2019-06-25 RX ADMIN — ASPIRIN SCH MG: 81 TABLET, CHEWABLE ORAL at 09:30

## 2019-06-25 RX ADMIN — Medication SCH ML: at 23:54

## 2019-06-25 RX ADMIN — POTASSIUM & SODIUM PHOSPHATES POWDER PACK 280-160-250 MG SCH EACH: 280-160-250 PACK at 06:00

## 2019-06-25 RX ADMIN — POTASSIUM & SODIUM PHOSPHATES POWDER PACK 280-160-250 MG SCH EACH: 280-160-250 PACK at 15:00

## 2019-06-25 RX ADMIN — HYDRALAZINE HYDROCHLORIDE SCH MG: 25 TABLET, FILM COATED ORAL at 15:00

## 2019-06-25 RX ADMIN — DEXTROSE SCH MLS/HR: 5 SOLUTION INTRAVENOUS at 19:43

## 2019-06-25 NOTE — PROGRESS NOTE
Assessment and Plan


Assessment and plan: 


--Possible TIA: Supportive care as, further neuro workup  if needed





--Metabolic encephalopathy; multifactorial, possible sepsis, CVA TIA


Supportive care, 


CT negative for acute abnormality


Carotid Doppler; less than 50% stenosis


Check MRI of the brain[unable to obtain as no family available]





--Community acquired pneumonia; empiric antibiotics


Follow cultures and supportive care





--Sepsis secondary to community acquired pneumonia





--Hypertensive urgency; present on admission


Moderate control, add oral hydralazine, when necessary IV hydralazine


Closely monitor





--Acute sinusitis; on CT, empiric antibiotics


Closely monitor





--Hypokalemia; replace per protocol monitor levels





--Hypernatremia;D5W, monitor electrolytes





--Leukocytosis; secondary to possible pneumonia, acute sinusitis


Trending down, continue antibiotics, follow cultures





--Dyslipidemia; continue statin





--DVT to prophylaxis; Lovenox





--Full code





--Discharge planning; could not find family contact information


Continue current management














History


Interval history: 


Patient seen and examined this morning medical records reviewed


Patient is more alert today, responding to simple questions appropriately


Restraints for safety as patient is getting In bed and pulling


Patient looks and feels better


Not in acute distress


Vital signs noted








Hospitalist Physical





- Constitutional


Vitals: 


                                        











Temp Pulse Resp BP Pulse Ox


 


 98.8 F   81   20   149/65   95 


 


 06/25/19 08:06  06/25/19 08:06  06/25/19 08:06  06/25/19 08:06  06/25/19 08:23











General appearance: Present: no acute distress, well-nourished, other (confused 

at times)





- EENT


Eyes: Present: PERRL, EOM intact





- Neck


Neck: Present: supple, normal ROM





- Respiratory


Respiratory effort: normal


Respiratory: bilateral: diminished, rales, negative: rhonchi, wheezing





- Cardiovascular


Rhythm: regular


Heart Sounds: Present: S1 & S2





- Extremities


Extremities: no ischemia, No edema





- Abdominal


General gastrointestinal: soft, non-tender, non-distended, normal bowel sounds





- Integumentary


Integumentary: Present: clear, warm





- Psychiatric


Psychiatric: agitated, other (confused)





- Neurologic


Neurologic: moves all extremities





Results





- Labs


CBC & Chem 7: 


                                 06/26/19 05:29





                                 06/26/19 05:29


Labs: 


                             Laboratory Last Values











WBC  13.3 K/mm3 (4.5-11.0)  H  06/25/19  06:08    


 


RBC  4.88 M/mm3 (3.65-5.03)   06/25/19  06:08    


 


Hgb  15.3 gm/dl (11.8-15.2)  H  06/25/19  06:08    


 


Hct  43.9 % (35.5-45.6)   06/25/19  06:08    


 


MCV  90 fl (84-94)   06/25/19  06:08    


 


MCH  31 pg (28-32)   06/25/19  06:08    


 


MCHC  35 % (32-34)  H  06/25/19  06:08    


 


RDW  14.7 % (13.2-15.2)   06/25/19  06:08    


 


Plt Count  220 K/mm3 (140-440)   06/25/19  06:08    


 


Lymph % (Auto)  10.2 % (13.4-35.0)  L  06/25/19  06:08    


 


Mono % (Auto)  8.2 % (0.0-7.3)  H  06/25/19  06:08    


 


Eos % (Auto)  1.6 % (0.0-4.3)   06/25/19  06:08    


 


Baso % (Auto)  1.0 % (0.0-1.8)   06/25/19  06:08    


 


Lymph #  1.4 K/mm3 (1.2-5.4)   06/25/19  06:08    


 


Mono #  1.1 K/mm3 (0.0-0.8)  H  06/25/19  06:08    


 


Eos #  0.2 K/mm3 (0.0-0.4)   06/25/19  06:08    


 


Baso #  0.1 K/mm3 (0.0-0.1)   06/25/19  06:08    


 


Seg Neutrophils %  79.0 % (40.0-70.0)  H  06/25/19  06:08    


 


Seg Neutrophils #  10.5 K/mm3 (1.8-7.7)  H  06/25/19  06:08    


 


PT  13.1 Sec. (12.2-14.9)   06/19/19  Unknown


 


INR  1.02  (0.87-1.13)   06/19/19  Unknown


 


APTT  29.6 Sec. (24.2-36.6)   06/19/19  Unknown


 


Sodium  141 mmol/L (137-145)   06/25/19  06:08    


 


Potassium  3.6 mmol/L (3.6-5.0)   06/25/19  06:08    


 


Chloride  104.8 mmol/L ()   06/25/19  06:08    


 


Carbon Dioxide  19 mmol/L (22-30)  L  06/25/19  06:08    


 


  21 mmol/L  06/25/19  06:08    


 


BUN  27 mg/dL (9-20)  H  06/25/19  06:08    


 


  1.3 mg/dL (0.8-1.5)   06/25/19  06:08    


 


Estimated GFR  55 ml/min  06/25/19  06:08    


 


  21 %  06/25/19  06:08    


 


Glucose  132 mg/dL ()  H  06/25/19  06:08    


 


Lactic Acid  1.40 mmol/L (0.7-2.0)   06/19/19  16:08    


 


Calcium  9.9 mg/dL (8.4-10.2)   06/25/19  06:08    


 


Phosphorus  3.30 mg/dL (2.5-4.5)   06/22/19  05:35    


 


Magnesium  2.70 mg/dL (1.7-2.3)  H  06/23/19  05:25    


 


  0.60 mg/dL (0.1-1.2)   06/19/19  Unknown


 


AST  21 units/L (5-40)   06/19/19  Unknown


 


ALT  26 units/L (7-56)   06/19/19  Unknown


 


  64 units/L ()   06/19/19  Unknown


 


  7.0 g/dL (6.3-8.2)   06/19/19  Unknown


 


  4.4 g/dL (3.9-5)   06/19/19  Unknown


 


  1.7 %  06/19/19  Unknown


 


Triglycerides  87 mg/dL (2-149)   06/20/19  05:32    


 


Cholesterol  195 mg/dL ()   06/20/19  05:32    


 


  115 mg/dL ()   06/20/19  05:32    


 


  72 mg/dL (40-59)  H  06/20/19  05:32    


 


  2.70 %  06/20/19  05:32    


 


  Yellow  (Yellow)   06/19/19  17:50    


 


  Clear  (Clear)   06/19/19  17:50    


 


  7.0  (5.0-7.0)   06/19/19  17:50    


 


Ur Specific Gravity  1.012  (1.003-1.030)   06/19/19  17:50    


 


  100 mg/dl mg/dL (Negative)   06/19/19  17:50    


 


  Neg mg/dL (Negative)   06/19/19  17:50    


 


  Tr mg/dL (Negative)   06/19/19  17:50    


 


  Neg  (Negative)   06/19/19  17:50    


 


  Neg  (Negative)   06/19/19  17:50    


 


  Neg  (Negative)   06/19/19  17:50    


 


  2.0 mg/dL (<2.0)   06/19/19  17:50    


 


Ur Leukocyte Esterase  Neg  (Negative)   06/19/19  17:50    


 


  < 1.0 /HPF (0.0-6.0)   06/19/19  17:50    


 


  2.0 /HPF (0.0-6.0)   06/19/19  17:50    


 


  Presumptive negative   06/19/19  17:50    


 


  Presumptive negative   06/19/19  17:50    


 


Ur Barbiturates Screen  Presumptive negative   06/19/19  17:50    


 


Ur Phencyclidine Scrn  Presumptive negative   06/19/19  17:50    


 


Ur Amphetamines Screen  Presumptive negative   06/19/19  17:50    


 


U Benzodiazepines Scrn  Presumptive negative   06/19/19  17:50    


 


  Presumptive negative   06/19/19  17:50    


 


U Marijuana (THC) Screen  Presumptive negative   06/19/19  17:50    


 


  Disclamer   06/19/19  17:50    


 


Plasma/Serum Alcohol  < 0.01 % (0-0.07)   06/19/19  19:22    














Active Medications





- Current Medications


Current Medications: 














Generic Name Dose Route Start Last Admin





  Trade Name Freq  PRN Reason Stop Dose Admin


 


Acetaminophen  650 mg  06/19/19 20:08 





  Tylenol  PO  





  Q4H PRN  





  Pain MILD(1-3)/Fever >100.5/HA  


 


Aspirin  81 mg  06/20/19 10:00  06/25/19 09:30





  Baby Aspirin  PO   81 mg





  QDAY SARA   Administration


 


Atorvastatin Calcium  20 mg  06/19/19 22:00  06/24/19 23:26





  Lipitor  PO   20 mg





  QHS SARA   Administration


 


Docusate Sodium  100 mg  06/19/19 22:00  06/25/19 09:30





  Colace  PO   100 mg





  BID SARA   Administration


 


Enoxaparin Sodium  40 mg  06/20/19 10:00  06/25/19 09:30





  Lovenox  SUB-Q   40 mg





  QDAY SARA   Administration


 


Hydralazine HCl  10 mg  06/19/19 20:14  06/22/19 16:16





  Apresoline  IV   10 mg





  Q4H PRN   Administration





  Blood Pressure  


 


Hydralazine HCl  25 mg  06/22/19 22:00  06/25/19 05:52





  Apresoline  PO   25 mg





  Q8HR SARA   Administration


 


Dextrose  1,000 mls @ 75 mls/hr  06/22/19 20:00  06/24/19 14:35





  D5w  IV   50 mls/hr





  AS DIRECT SARA   Administration


 


Levofloxacin  500 mg  06/24/19 10:00  06/25/19 09:30





  Levaquin  PO   500 mg





  Q24HR SARA   Administration


 


Lorazepam  2 mg  06/19/19 21:56  06/23/19 14:47





  Ativan  IV   2 mg





  Q1H PRN   Administration





  CIWA-Ar 8-15  


 


Lorazepam  4 mg  06/19/19 21:56  06/23/19 17:11





  Ativan  IV   4 mg





  Q1H PRN   Administration





  CIWA-Ar 16-25  


 


Ondansetron HCl  4 mg  06/19/19 20:08 





  Zofran  IV  





  Q8H PRN  





  Nausea And Vomiting  


 


Potassium Phos/Sodium Phos  1 each  06/20/19 22:00  06/25/19 06:00





  Phos-Nak  PO   1 each





  Q8HR SARA   Administration


 


Sodium Chloride  10 ml  06/19/19 22:00  06/25/19 09:30





  Sodium Chloride Flush Syringe 10 Ml  IV   10 ml





  BID SARA   Administration


 


Sodium Chloride  10 ml  06/19/19 20:08 





  Sodium Chloride Flush Syringe 10 Ml  IV  





  PRN PRN  





  LINE FLUSH

## 2019-06-26 LAB
BASOPHILS # (AUTO): 0.1 K/MM3 (ref 0–0.1)
BASOPHILS NFR BLD AUTO: 0.4 % (ref 0–1.8)
BUN SERPL-MCNC: 22 MG/DL (ref 9–20)
BUN/CREAT SERPL: 18 %
CALCIUM SERPL-MCNC: 9.3 MG/DL (ref 8.4–10.2)
EOSINOPHIL # BLD AUTO: 0 K/MM3 (ref 0–0.4)
EOSINOPHIL NFR BLD AUTO: 0.2 % (ref 0–4.3)
HCT VFR BLD CALC: 38.9 % (ref 35.5–45.6)
HEMOLYSIS INDEX: 4
HGB BLD-MCNC: 13.6 GM/DL (ref 11.8–15.2)
LYMPHOCYTES # BLD AUTO: 1 K/MM3 (ref 1.2–5.4)
LYMPHOCYTES NFR BLD AUTO: 6.3 % (ref 13.4–35)
MCHC RBC AUTO-ENTMCNC: 35 % (ref 32–34)
MCV RBC AUTO: 90 FL (ref 84–94)
MONOCYTES # (AUTO): 1.2 K/MM3 (ref 0–0.8)
MONOCYTES % (AUTO): 7.2 % (ref 0–7.3)
PLATELET # BLD: 205 K/MM3 (ref 140–440)
RBC # BLD AUTO: 4.3 M/MM3 (ref 3.65–5.03)

## 2019-06-26 RX ADMIN — LEVOFLOXACIN SCH MG: 500 TABLET, FILM COATED ORAL at 09:07

## 2019-06-26 RX ADMIN — DOCUSATE SODIUM SCH MG: 100 CAPSULE, LIQUID FILLED ORAL at 09:07

## 2019-06-26 RX ADMIN — HYDRALAZINE HYDROCHLORIDE SCH MG: 25 TABLET, FILM COATED ORAL at 06:02

## 2019-06-26 RX ADMIN — POTASSIUM & SODIUM PHOSPHATES POWDER PACK 280-160-250 MG SCH EACH: 280-160-250 PACK at 14:16

## 2019-06-26 RX ADMIN — DEXTROSE SCH MLS/HR: 5 SOLUTION INTRAVENOUS at 09:12

## 2019-06-26 RX ADMIN — POTASSIUM & SODIUM PHOSPHATES POWDER PACK 280-160-250 MG SCH EACH: 280-160-250 PACK at 23:09

## 2019-06-26 RX ADMIN — ENOXAPARIN SODIUM SCH MG: 100 INJECTION SUBCUTANEOUS at 09:07

## 2019-06-26 RX ADMIN — Medication SCH ML: at 09:08

## 2019-06-26 RX ADMIN — POTASSIUM & SODIUM PHOSPHATES POWDER PACK 280-160-250 MG SCH EACH: 280-160-250 PACK at 06:02

## 2019-06-26 RX ADMIN — HYDRALAZINE HYDROCHLORIDE SCH MG: 25 TABLET, FILM COATED ORAL at 14:16

## 2019-06-26 RX ADMIN — DOCUSATE SODIUM SCH MG: 100 CAPSULE, LIQUID FILLED ORAL at 23:08

## 2019-06-26 RX ADMIN — HYDRALAZINE HYDROCHLORIDE SCH MG: 25 TABLET, FILM COATED ORAL at 23:08

## 2019-06-26 RX ADMIN — ASPIRIN SCH MG: 81 TABLET, CHEWABLE ORAL at 09:07

## 2019-06-26 NOTE — PROGRESS NOTE
Assessment and Plan


Assessment and plan: 


--Metabolic encephalopathy; multifactorial, sepsis, possible TIA


Dementia .  Resolved


Patient is alert awake oriented today





CT negative for acute abnormality


Carotid Doppler; less than 50% stenosis


Check MRI of the brain[unable to obtain as no family available]





--Community acquired pneumonia; present on admission 


empiric antibiotics,culturesnegative so far , f/u chest x-ray





--Sepsis secondary to community acquired pneumonia





--Hypertensive urgency; present on admission


Moderate control, add oral hydralazine, when necessary IV hydralazine





--Acute sinusitis; on CT, empiric antibiotics





--Hypokalemia; replace per protocol monitor levels





--Hypernatremia;D5W, monitor electrolytes





--Leukocytosis; secondary to possible pneumonia, acute sinusitis


Trending down, continue antibiotics, follow cultures





--Dyslipidemia; continue statin





--DVT to prophylaxis; Lovenox





--Full code





--Physical therapy occupational therapy





--Discharge planning; possible subacute/SNF placement


Plan of care is reviewed with the patient and his neighbor at the bedside


Also discussed with case management and patient's nurse











History


Interval history: 


Patient seen and examined medical records reviewed


Patient is alert awake oriented today off restrains


No new complaints Vital signs noted








Hospitalist Physical





- Constitutional


Vitals: 


                                        











Temp Pulse Resp BP Pulse Ox


 


 98.5 F   80   20   124/73   95 


 


 19 13:08  19 14:16  19 13:08  19 14:16  19 13:08











General appearance: Present: no acute distress, well-nourished, other (alert 

awake oriented today)





- EENT


Eyes: Present: PERRL, EOM intact





- Neck


Neck: Present: supple, normal ROM





- Respiratory


Respiratory effort: normal


Respiratory: bilateral: diminished, negative: rales, rhonchi, wheezing





- Cardiovascular


Rhythm: regular


Heart Sounds: Present: S1 & S2





- Extremities


Extremities: no ischemia, No edema





- Abdominal


General gastrointestinal: soft, non-tender, non-distended, normal bowel sounds





- Integumentary


Integumentary: Present: clear, warm





- Psychiatric


Psychiatric: appropriate mood/affect, cooperative





- Neurologic


Neurologic: moves all extremities





Results





- Labs


CBC & Chem 7: 


                                 19 05:29





                                 19 05:29


Labs: 


                             Laboratory Last Values











WBC  16.4 K/mm3 (4.5-11.0)  H  19  05:29    


 


RBC  4.30 M/mm3 (3.65-5.03)   19  05:29    


 


Hgb  13.6 gm/dl (11.8-15.2)   19  05:29    


 


Hct  38.9 % (35.5-45.6)   19  05:29    


 


MCV  90 fl (84-94)   19  05:29    


 


MCH  32 pg (28-32)   19  05:29    


 


MCHC  35 % (32-34)  H  19  05:29    


 


RDW  14.3 % (13.2-15.2)   19  05:29    


 


Plt Count  205 K/mm3 (140-440)   19  05:29    


 


Lymph % (Auto)  6.3 % (13.4-35.0)  L  19  05:29    


 


Mono % (Auto)  7.2 % (0.0-7.3)   19  05:29    


 


Eos % (Auto)  0.2 % (0.0-4.3)   19  05:29    


 


Baso % (Auto)  0.4 % (0.0-1.8)   19  05:29    


 


Lymph #  1.0 K/mm3 (1.2-5.4)  L  19  05:29    


 


Mono #  1.2 K/mm3 (0.0-0.8)  H  19  05:29    


 


Eos #  0.0 K/mm3 (0.0-0.4)   19  05:29    


 


Baso #  0.1 K/mm3 (0.0-0.1)   19  05:29    


 


Seg Neutrophils %  85.9 % (40.0-70.0)  H  19  05:29    


 


Seg Neutrophils #  14.1 K/mm3 (1.8-7.7)  H  19  05:29    


 


PT  13.1 Sec. (12.2-14.9)   19  Unknown


 


INR  1.02  (0.87-1.13)   19  Unknown


 


APTT  29.6 Sec. (24.2-36.6)   19  Unknown


 


Sodium  135 mmol/L (137-145)  L  19  05:29    


 


Potassium  3.6 mmol/L (3.6-5.0)   19  05:29    


 


Chloride  99.3 mmol/L ()   19  05:29    


 


Carbon Dioxide  22 mmol/L (22-30)   19  05:29    


 


  17 mmol/L  19  05:29    


 


BUN  22 mg/dL (9-20)  H  19  05:29    


 


  1.2 mg/dL (0.8-1.5)   19  05:29    


 


Estimated GFR  > 60 ml/min  19  05:29    


 


  18 %  19  05:29    


 


Glucose  156 mg/dL ()  H  19  05:29    


 


Lactic Acid  1.40 mmol/L (0.7-2.0)   19  16:08    


 


Calcium  9.3 mg/dL (8.4-10.2)   19  05:29    


 


Phosphorus  3.30 mg/dL (2.5-4.5)   19  05:35    


 


Magnesium  2.70 mg/dL (1.7-2.3)  H  19  05:25    


 


  0.60 mg/dL (0.1-1.2)   19  Unknown


 


AST  21 units/L (5-40)   19  Unknown


 


ALT  26 units/L (7-56)   19  Unknown


 


  64 units/L ()   19  Unknown


 


  7.0 g/dL (6.3-8.2)   19  Unknown


 


  4.4 g/dL (3.9-5)   19  Unknown


 


  1.7 %  19  Unknown


 


Triglycerides  87 mg/dL (2-149)   19  05:32    


 


Cholesterol  195 mg/dL ()   19  05:32    


 


  115 mg/dL ()   19  05:32    


 


  72 mg/dL (40-59)  H  19  05:32    


 


  2.70 %  19  05:32    


 


  Yellow  (Yellow)   19  17:50    


 


  Clear  (Clear)   19  17:50    


 


  7.0  (5.0-7.0)   19  17:50    


 


Ur Specific Gravity  1.012  (1.003-1.030)   19  17:50    


 


  100 mg/dl mg/dL (Negative)   19  17:50    


 


  Neg mg/dL (Negative)   19  17:50    


 


  Tr mg/dL (Negative)   19  17:50    


 


  Neg  (Negative)   19  17:50    


 


  Neg  (Negative)   19  17:50    


 


  Neg  (Negative)   19  17:50    


 


  2.0 mg/dL (<2.0)   19  17:50    


 


Ur Leukocyte Esterase  Neg  (Negative)   19  17:50    


 


  < 1.0 /HPF (0.0-6.0)   19  17:50    


 


  2.0 /HPF (0.0-6.0)   19  17:50    


 


  Presumptive negative   19  17:50    


 


  Presumptive negative   19  17:50    


 


Ur Barbiturates Screen  Presumptive negative   19  17:50    


 


Ur Phencyclidine Scrn  Presumptive negative   19  17:50    


 


Ur Amphetamines Screen  Presumptive negative   19  17:50    


 


U Benzodiazepines Scrn  Presumptive negative   19  17:50    


 


  Presumptive negative   19  17:50    


 


U Marijuana (THC) Screen  Presumptive negative   19  17:50    


 


  Disclamer   19  17:50    


 


Plasma/Serum Alcohol  < 0.01 % (0-0.07)   19  19:22    














Active Medications





- Current Medications


Current Medications: 














Generic Name Dose Route Start Last Admin





  Trade Name Freq  PRN Reason Stop Dose Admin


 


Acetaminophen  650 mg  19 20:08  19 06:11





  Tylenol  PO   650 mg





  Q4H PRN   Administration





  Pain MILD(1-3)/Fever >100.5/HA  


 


Aspirin  81 mg  19 10:00  19 09:07





  Baby Aspirin  PO   81 mg





  QDAY SARA   Administration


 


Atorvastatin Calcium  20 mg  19 22:00  19 23:53





  Lipitor  PO   20 mg





  QHS SARA   Administration


 


Docusate Sodium  100 mg  19 22:00  19 09:07





  Colace  PO   100 mg





  BID SARA   Administration


 


Enoxaparin Sodium  40 mg  19 10:00  19 09:07





  Lovenox  SUB-Q   40 mg





  QDAY SARA   Administration


 


Hydralazine HCl  10 mg  19 20:14  19 16:16





  Apresoline  IV   10 mg





  Q4H PRN   Administration





  Blood Pressure  


 


Hydralazine HCl  25 mg  19 22:00  19 14:16





  Apresoline  PO   25 mg





  Q8HR SARA   Administration


 


Dextrose  1,000 mls @ 75 mls/hr  19 20:00  19 09:12





  D5w  IV   50 mls/hr





  AS DIRECT SARA   Administration


 


Levofloxacin  500 mg  19 10:00  19 09:07





  Levaquin  PO   500 mg





  Q24HR SARA   Administration


 


Lorazepam  2 mg  19 21:56  19 14:47





  Ativan  IV   2 mg





  Q1H PRN   Administration





  CIWA-Ar 8-15  


 


Lorazepam  4 mg  19 21:56  19 17:11





  Ativan  IV   4 mg





  Q1H PRN   Administration





  CIWA-Ar 16-25  


 


Ondansetron HCl  4 mg  19 20:08 





  Zofran  IV  





  Q8H PRN  





  Nausea And Vomiting  


 


Potassium Phos/Sodium Phos  1 each  19 22:00  19 14:16





  Phos-Nak  PO   1 each





  Q8HR SARA   Administration


 


Sodium Chloride  10 ml  19 22:00  19 09:08





  Sodium Chloride Flush Syringe 10 Ml  IV   10 ml





  BID SARA   Administration


 


Sodium Chloride  10 ml  19 20:08 





  Sodium Chloride Flush Syringe 10 Ml  IV  





  PRN PRN  





  LINE FLUSH  














Nutrition/Malnutrition Assess





- Dietary Evaluation


Nutrition/Malnutrition Findings: 


                                        





Nutrition Notes                                            Start:  19 

13:49


Freq:                                                      Status: Active       




Protocol:                                                                       




 Document     19 13:49  CALEB  (Rec: 19 14:02  CALEB  SRW-

[id number])


 Nutrition Notes


     Need for Assessment generated from:         LOS


     Initial or Follow up                        Assessment


     Current Diagnosis                           Sepsis,Hypertension


     Other Pertinent Diagnosis                   Metabolic encephalopathy, pneu


     Current Diet                                Regular


     Labs/Tests                                  Na 135


                                                 


     Pertinent Medications                       D5 at 75ml/hr, Colace, Phos-


                                                 Nak TID


     Height                                      5 ft 9 in


     Weight                                      71 kg


     Usual Body Weight                           81.8 kg


     Ideal Body Weight (kg)                      72.72


     BMI                                         23.1


     Intake Prior to Admission                   Fair


     Weight change and time frame                Pt reports 13.2% unintentional


                                                 wt loss over the past yr


     Weight Status                               Appropriate


     Subjective/Other Information                Pt screened for LOS.


                                                 Currently on CIWA protocol.


                                                 Reports that his appetite is "


                                                 not the best".  Per RN report,


                                                 he is eating ~25% of meals.


                                                 Pt describes symptoms


                                                 associated with heartburn.


                                                 Spoke with RN to see if he can


                                                 get medication for relief.


     Percent of energy/protein needs met:        30% energy


                                                 31% pro


     Burn                                        Absent


     Trauma                                      Absent


     GI Symptoms                                 Other


     Current % PO                                Poor (25-49%)


     Energy Intake (severe)                      < or equal to 50% Estimated


                                                 Energy Requirement > or equal


                                                 to 5 days


     Interpretation of Weight Loss (non-         10% in 6 months


      severe)                                    


     #1


      Nutrition Diagnosis                        Malnutrition


      Etiology                                   encephalopathy


      As Evidenced by Signs and Symptoms         pt with unintentional wt loss


                                                 and poor PO intake


     Is patient on ventilator?                   No


     Is Patient Ambulatory and/or Out of Bed     Yes


     REE-([facility]-ambulatory/OOB) [     [id number]


      [email or web address]]                               


     Calculation Used for Recommendations        [facility]


     Additional Notes                            Pro needs 1-1.2g/k-85g/


                                                 day


                                                 Fluid needs 1ml/kcal


 Nutrition Intervention


     Change Diet Order:                          Continue current diet


     Add Supplement/Snack (indicate name/kcal    Ensure BID


      /protein )                                 


     Provides kCal:                              700


     Provides Protein (gm)                       40


     Goal #1                                     PO intake of meals plus ONS to


                                                 meet at least 75% energy and


                                                 pro needs


     Goal #2                                     Wt maintenance


     Anticipated Discharge Needs:                Continue ONS 1-2 times daily


                                                 if PO intake remains


                                                 suboptimal


     Follow-Up By:                               19


     Additional Comments                         F/U: intakes (meals/ONS), wt

## 2019-06-27 VITALS — DIASTOLIC BLOOD PRESSURE: 73 MMHG | SYSTOLIC BLOOD PRESSURE: 131 MMHG

## 2019-06-27 RX ADMIN — HYDRALAZINE HYDROCHLORIDE SCH MG: 25 TABLET, FILM COATED ORAL at 13:33

## 2019-06-27 RX ADMIN — POTASSIUM & SODIUM PHOSPHATES POWDER PACK 280-160-250 MG SCH EACH: 280-160-250 PACK at 13:33

## 2019-06-27 RX ADMIN — ENOXAPARIN SODIUM SCH MG: 100 INJECTION SUBCUTANEOUS at 09:25

## 2019-06-27 RX ADMIN — LEVOFLOXACIN SCH MG: 500 TABLET, FILM COATED ORAL at 09:25

## 2019-06-27 RX ADMIN — ASPIRIN SCH MG: 81 TABLET, CHEWABLE ORAL at 09:25

## 2019-06-27 RX ADMIN — Medication SCH ML: at 09:26

## 2019-06-27 RX ADMIN — DOCUSATE SODIUM SCH MG: 100 CAPSULE, LIQUID FILLED ORAL at 09:25

## 2019-06-27 NOTE — XRAY REPORT
PROCEDURE: XR CHEST 1V AP 

 

TECHNIQUE:  Chest radiograph single view.  

 

HISTORY: f/u pneumonia 

 

COMPARISONS: Comparison is dated June 19, 2019 . 

 

FINDINGS: 

 

Heart: Normal. 

Mediastinum/Vessels: Normal. 

Lungs/Pleural space:  Normal. 

Bony thorax: No acute osseous abnormality. 

Life support devices: None. 

 

Pulmonary opacities seen previously no longer identified 

 

IMPRESSION:  No acute cardiopulmonary abnormality. 

 

This document is electronically signed by Charly Beckford MD., June 27 2019 05:19:35 PM ET
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
## 2023-07-28 ENCOUNTER — OUT OF OFFICE VISIT (OUTPATIENT)
Dept: URBAN - METROPOLITAN AREA MEDICAL CENTER 16 | Facility: MEDICAL CENTER | Age: 73
End: 2023-07-28
Payer: MEDICARE

## 2023-07-28 DIAGNOSIS — R13.19 CERVICAL DYSPHAGIA: ICD-10-CM

## 2023-07-28 PROCEDURE — G8427 DOCREV CUR MEDS BY ELIG CLIN: HCPCS

## 2023-07-28 PROCEDURE — 99221 1ST HOSP IP/OBS SF/LOW 40: CPT

## 2023-08-09 ENCOUNTER — CLAIMS CREATED FROM THE CLAIM WINDOW (OUTPATIENT)
Dept: URBAN - METROPOLITAN AREA MEDICAL CENTER 16 | Facility: MEDICAL CENTER | Age: 73
End: 2023-08-09

## 2023-08-09 ENCOUNTER — CLAIMS CREATED FROM THE CLAIM WINDOW (OUTPATIENT)
Dept: URBAN - METROPOLITAN AREA MEDICAL CENTER 16 | Facility: MEDICAL CENTER | Age: 73
End: 2023-08-09
Payer: MEDICARE

## 2023-08-09 DIAGNOSIS — R13.19 CERVICAL DYSPHAGIA: ICD-10-CM

## 2023-08-09 DIAGNOSIS — I69.391 CVA, OLD, DYSPHAGIA: ICD-10-CM

## 2023-08-09 PROCEDURE — 99232 SBSQ HOSP IP/OBS MODERATE 35: CPT | Performed by: INTERNAL MEDICINE

## 2023-08-10 ENCOUNTER — CLAIMS CREATED FROM THE CLAIM WINDOW (OUTPATIENT)
Dept: URBAN - METROPOLITAN AREA MEDICAL CENTER 16 | Facility: MEDICAL CENTER | Age: 73
End: 2023-08-10
Payer: MEDICARE

## 2023-08-10 ENCOUNTER — CLAIMS CREATED FROM THE CLAIM WINDOW (OUTPATIENT)
Dept: URBAN - METROPOLITAN AREA MEDICAL CENTER 16 | Facility: MEDICAL CENTER | Age: 73
End: 2023-08-10

## 2023-08-10 DIAGNOSIS — R13.19 CERVICAL DYSPHAGIA: ICD-10-CM

## 2023-08-10 DIAGNOSIS — J96.01 ACUTE RESPIRATORY FAILURE WITH HYPOXEMIA: ICD-10-CM

## 2023-08-10 DIAGNOSIS — I69.391 CVA, OLD, DYSPHAGIA: ICD-10-CM

## 2023-08-10 PROCEDURE — 99232 SBSQ HOSP IP/OBS MODERATE 35: CPT

## 2023-08-14 ENCOUNTER — CLAIMS CREATED FROM THE CLAIM WINDOW (OUTPATIENT)
Dept: URBAN - METROPOLITAN AREA MEDICAL CENTER 16 | Facility: MEDICAL CENTER | Age: 73
End: 2023-08-14
Payer: MEDICARE

## 2023-08-14 ENCOUNTER — CLAIMS CREATED FROM THE CLAIM WINDOW (OUTPATIENT)
Dept: URBAN - METROPOLITAN AREA MEDICAL CENTER 16 | Facility: MEDICAL CENTER | Age: 73
End: 2023-08-14

## 2023-08-14 DIAGNOSIS — R13.19 CERVICAL DYSPHAGIA: ICD-10-CM

## 2023-08-14 DIAGNOSIS — I69.391 CVA, OLD, DYSPHAGIA: ICD-10-CM

## 2023-08-14 DIAGNOSIS — Z93.1 FEEDING BY G-TUBE: ICD-10-CM

## 2023-08-14 DIAGNOSIS — J96.01 ACUTE RESPIRATORY FAILURE WITH HYPOXEMIA: ICD-10-CM

## 2023-08-14 PROCEDURE — 99232 SBSQ HOSP IP/OBS MODERATE 35: CPT

## 2023-08-15 ENCOUNTER — OUT OF OFFICE VISIT (OUTPATIENT)
Dept: URBAN - METROPOLITAN AREA MEDICAL CENTER 16 | Facility: MEDICAL CENTER | Age: 73
End: 2023-08-15

## 2023-08-15 ENCOUNTER — CLAIMS CREATED FROM THE CLAIM WINDOW (OUTPATIENT)
Dept: URBAN - METROPOLITAN AREA MEDICAL CENTER 16 | Facility: MEDICAL CENTER | Age: 73
End: 2023-08-15
Payer: MEDICARE

## 2023-08-15 DIAGNOSIS — R13.19 CERVICAL DYSPHAGIA: ICD-10-CM

## 2023-08-15 DIAGNOSIS — K21.00 ALKALINE REFLUX ESOPHAGITIS: ICD-10-CM

## 2023-08-15 PROCEDURE — 43246 EGD PLACE GASTROSTOMY TUBE: CPT | Performed by: INTERNAL MEDICINE

## 2023-08-19 ENCOUNTER — OUT OF OFFICE VISIT (OUTPATIENT)
Dept: URBAN - METROPOLITAN AREA MEDICAL CENTER 16 | Facility: MEDICAL CENTER | Age: 73
End: 2023-08-19
Payer: MEDICARE

## 2023-08-19 DIAGNOSIS — Z93.1 FEEDING BY G-TUBE: ICD-10-CM

## 2023-08-19 DIAGNOSIS — K92.1 ACUTE MELENA: ICD-10-CM

## 2023-08-19 DIAGNOSIS — K21.00 ALKALINE REFLUX ESOPHAGITIS: ICD-10-CM

## 2023-08-19 PROCEDURE — 99231 SBSQ HOSP IP/OBS SF/LOW 25: CPT | Performed by: INTERNAL MEDICINE

## 2023-08-19 PROCEDURE — 99232 SBSQ HOSP IP/OBS MODERATE 35: CPT | Performed by: INTERNAL MEDICINE
